# Patient Record
Sex: FEMALE | Race: WHITE | Employment: OTHER | URBAN - METROPOLITAN AREA
[De-identification: names, ages, dates, MRNs, and addresses within clinical notes are randomized per-mention and may not be internally consistent; named-entity substitution may affect disease eponyms.]

---

## 2017-03-16 PROBLEM — I65.29 CAROTID STENOSIS: Status: ACTIVE | Noted: 2017-03-16

## 2017-03-16 PROBLEM — M75.100 TORN ROTATOR CUFF: Status: ACTIVE | Noted: 2017-03-16

## 2017-03-27 ENCOUNTER — TELEPHONE (OUTPATIENT)
Dept: CARDIOLOGY CLINIC | Age: 72
End: 2017-03-27

## 2017-03-27 ENCOUNTER — OFFICE VISIT (OUTPATIENT)
Dept: CARDIOLOGY CLINIC | Age: 72
End: 2017-03-27

## 2017-03-27 VITALS
DIASTOLIC BLOOD PRESSURE: 78 MMHG | BODY MASS INDEX: 28.1 KG/M2 | HEART RATE: 56 BPM | SYSTOLIC BLOOD PRESSURE: 134 MMHG | HEIGHT: 63 IN | WEIGHT: 158.6 LBS

## 2017-03-27 DIAGNOSIS — R55 SYNCOPE, UNSPECIFIED SYNCOPE TYPE: Primary | ICD-10-CM

## 2017-03-27 PROCEDURE — 99214 OFFICE O/P EST MOD 30 MIN: CPT | Performed by: INTERNAL MEDICINE

## 2017-03-27 RX ORDER — MINOCYCLINE HYDROCHLORIDE 50 MG/1
CAPSULE ORAL
COMMUNITY
Start: 2017-01-09 | End: 2018-12-20 | Stop reason: ALTCHOICE

## 2018-03-19 ENCOUNTER — OFFICE VISIT (OUTPATIENT)
Dept: CARDIOLOGY CLINIC | Age: 73
End: 2018-03-19

## 2018-03-19 VITALS
SYSTOLIC BLOOD PRESSURE: 132 MMHG | HEIGHT: 63 IN | BODY MASS INDEX: 28.14 KG/M2 | WEIGHT: 158.8 LBS | HEART RATE: 68 BPM | DIASTOLIC BLOOD PRESSURE: 76 MMHG

## 2018-03-19 DIAGNOSIS — I10 ESSENTIAL HYPERTENSION: Primary | ICD-10-CM

## 2018-03-19 PROCEDURE — 99214 OFFICE O/P EST MOD 30 MIN: CPT | Performed by: INTERNAL MEDICINE

## 2018-03-19 RX ORDER — LISINOPRIL 5 MG/1
5 TABLET ORAL DAILY
Qty: 30 TABLET | Refills: 3
Start: 2018-03-19 | End: 2018-12-20 | Stop reason: SDUPTHER

## 2018-03-19 NOTE — PROGRESS NOTES
arteries pulse and amplitude are normal no bruit, pedal pulses are normal  Femoral pulses have normal amplitude, no bruits   Extremities - No cyanosis, clubbing, or significant edema, no varicose veins    Abdomen  No masses, tenderness, or organomegaly, no hepato-splenomegally, no bruits  Musculoskeletal  No significant edema, no kyphosis or scoliosis, no deformity in any extremity noted, muscle strength and tone are normal  Skin: no ulcer,no scar,no stasis dermatitis   Neurologic  alert oriented times 3,Cranial nerves II through XII are grossly intact. There were no gross focal neurologic abnormalities. All sensory and motor nerves examined and were normal  Psychiatric: normal mood and affect    No results found for: CKTOTAL, CKMB, CKMBINDEX, TROPONINI  BNP:  No results found for: BNP  PT/INR:  No results found for: PTINR  No results found for: LABA1C  Lab Results   Component Value Date    CHOL 189 2015    TRIG 84 2015    HDL 77 2015    LDLDIRECT 109 (H) 2015     Lab Results   Component Value Date    ALT 16 2017    AST 36 2017     TSH:  No results found for: TSH    Impression:  Suzanna Costa is a 74 y. o.year old who  has a past medical history of History of ; Hyperlipidemia; Hypertension; and Sjoegren syndrome ([address] and presents with     Plan:  1. Syncope resolved. 2. Dyslipidemia: continue statins  3. HTN: stable, continue lopressor and lisinopril  4. Lymphedema:stable, continue compression socks  5. sjoegren syndrome: stable  6. Mild aortic stenosis: stable, conservative therapy is recommended  7. Health maintenance: exerise and diet  All labs, medications and tests reviewed, continue all other medications of all above medical condition listed as is.     [unfilled]

## 2018-12-14 ENCOUNTER — TELEPHONE (OUTPATIENT)
Dept: CARDIOLOGY CLINIC | Age: 73
End: 2018-12-14

## 2018-12-20 ENCOUNTER — OFFICE VISIT (OUTPATIENT)
Dept: CARDIOLOGY CLINIC | Age: 73
End: 2018-12-20
Payer: COMMERCIAL

## 2018-12-20 ENCOUNTER — NURSE ONLY (OUTPATIENT)
Dept: CARDIOLOGY CLINIC | Age: 73
End: 2018-12-20
Payer: COMMERCIAL

## 2018-12-20 VITALS
BODY MASS INDEX: 29.81 KG/M2 | DIASTOLIC BLOOD PRESSURE: 86 MMHG | WEIGHT: 162 LBS | HEIGHT: 62 IN | HEART RATE: 68 BPM | SYSTOLIC BLOOD PRESSURE: 170 MMHG

## 2018-12-20 DIAGNOSIS — R00.2 PALPITATIONS: Primary | ICD-10-CM

## 2018-12-20 DIAGNOSIS — I10 ESSENTIAL HYPERTENSION: ICD-10-CM

## 2018-12-20 PROCEDURE — 93000 ELECTROCARDIOGRAM COMPLETE: CPT | Performed by: INTERNAL MEDICINE

## 2018-12-20 PROCEDURE — 99214 OFFICE O/P EST MOD 30 MIN: CPT | Performed by: INTERNAL MEDICINE

## 2018-12-20 RX ORDER — LISINOPRIL 5 MG/1
20 TABLET ORAL DAILY
Qty: 30 TABLET | Refills: 3 | Status: SHIPPED | OUTPATIENT
Start: 2018-12-20 | End: 2019-02-18

## 2019-01-14 PROCEDURE — 93228 REMOTE 30 DAY ECG REV/REPORT: CPT | Performed by: INTERNAL MEDICINE

## 2019-01-15 ENCOUNTER — TELEPHONE (OUTPATIENT)
Dept: CARDIOLOGY CLINIC | Age: 74
End: 2019-01-15

## 2019-01-16 ENCOUNTER — OFFICE VISIT (OUTPATIENT)
Dept: CARDIOLOGY CLINIC | Age: 74
End: 2019-01-16
Payer: COMMERCIAL

## 2019-01-16 VITALS
SYSTOLIC BLOOD PRESSURE: 136 MMHG | HEART RATE: 64 BPM | DIASTOLIC BLOOD PRESSURE: 82 MMHG | HEIGHT: 61 IN | BODY MASS INDEX: 30.09 KG/M2 | WEIGHT: 159.4 LBS

## 2019-01-16 DIAGNOSIS — R00.2 PALPITATIONS: Primary | ICD-10-CM

## 2019-01-16 PROCEDURE — 99214 OFFICE O/P EST MOD 30 MIN: CPT | Performed by: INTERNAL MEDICINE

## 2019-01-18 ENCOUNTER — TELEPHONE (OUTPATIENT)
Dept: CARDIOLOGY CLINIC | Age: 74
End: 2019-01-18

## 2019-01-30 ENCOUNTER — TELEPHONE (OUTPATIENT)
Dept: CARDIOLOGY CLINIC | Age: 74
End: 2019-01-30

## 2019-02-11 ENCOUNTER — TELEPHONE (OUTPATIENT)
Dept: CARDIOLOGY CLINIC | Age: 74
End: 2019-02-11

## 2019-02-12 ENCOUNTER — TELEPHONE (OUTPATIENT)
Dept: CARDIOLOGY CLINIC | Age: 74
End: 2019-02-12

## 2019-02-18 ENCOUNTER — OFFICE VISIT (OUTPATIENT)
Dept: CARDIOLOGY CLINIC | Age: 74
End: 2019-02-18
Payer: COMMERCIAL

## 2019-02-18 ENCOUNTER — TELEPHONE (OUTPATIENT)
Dept: CARDIOLOGY CLINIC | Age: 74
End: 2019-02-18

## 2019-02-18 VITALS
DIASTOLIC BLOOD PRESSURE: 70 MMHG | BODY MASS INDEX: 30.47 KG/M2 | SYSTOLIC BLOOD PRESSURE: 134 MMHG | HEART RATE: 60 BPM | WEIGHT: 161.4 LBS | HEIGHT: 61 IN

## 2019-02-18 DIAGNOSIS — I35.0 NONRHEUMATIC AORTIC VALVE STENOSIS: Primary | ICD-10-CM

## 2019-02-18 PROCEDURE — 99214 OFFICE O/P EST MOD 30 MIN: CPT | Performed by: INTERNAL MEDICINE

## 2019-02-18 RX ORDER — LISINOPRIL 5 MG/1
5 TABLET ORAL DAILY
Status: ON HOLD | COMMUNITY
End: 2019-02-25 | Stop reason: HOSPADM

## 2019-02-20 ENCOUNTER — TELEPHONE (OUTPATIENT)
Dept: CARDIOLOGY CLINIC | Age: 74
End: 2019-02-20

## 2019-02-20 ENCOUNTER — HOSPITAL ENCOUNTER (OUTPATIENT)
Age: 74
Discharge: HOME | DRG: 233 | End: 2019-02-20
Payer: COMMERCIAL

## 2019-02-20 ENCOUNTER — HOSPITAL ENCOUNTER (OUTPATIENT)
Dept: GENERAL RADIOLOGY | Age: 74
Discharge: HOME | DRG: 233 | End: 2019-02-20
Payer: COMMERCIAL

## 2019-02-20 DIAGNOSIS — Z01.818 PRE-PROCEDURAL EXAMINATION: ICD-10-CM

## 2019-02-20 LAB
ANION GAP SERPL CALCULATED.3IONS-SCNC: 12 MMOL/L (ref 4–16)
APTT: 27.6 SECONDS (ref 21.2–33)
BASOPHILS ABSOLUTE: 0 K/CU MM
BASOPHILS RELATIVE PERCENT: 0.4 % (ref 0–1)
BUN BLDV-MCNC: 27 MG/DL (ref 6–23)
CALCIUM SERPL-MCNC: 9.4 MG/DL (ref 8.3–10.6)
CHLORIDE BLD-SCNC: 95 MMOL/L (ref 99–110)
CO2: 28 MMOL/L (ref 21–32)
CREAT SERPL-MCNC: 1.1 MG/DL (ref 0.6–1.1)
DIFFERENTIAL TYPE: ABNORMAL
EOSINOPHILS ABSOLUTE: 0.2 K/CU MM
EOSINOPHILS RELATIVE PERCENT: 2.3 % (ref 0–3)
GFR AFRICAN AMERICAN: 59 ML/MIN/1.73M2
GFR NON-AFRICAN AMERICAN: 48 ML/MIN/1.73M2
GLUCOSE BLD-MCNC: 91 MG/DL (ref 70–99)
HCT VFR BLD CALC: 42.9 % (ref 37–47)
HEMOGLOBIN: 13.4 GM/DL (ref 12.5–16)
IMMATURE NEUTROPHIL %: 0.5 % (ref 0–0.43)
INR BLD: 1.04 INDEX
LYMPHOCYTES ABSOLUTE: 1.9 K/CU MM
LYMPHOCYTES RELATIVE PERCENT: 24.5 % (ref 24–44)
MCH RBC QN AUTO: 28.8 PG (ref 27–31)
MCHC RBC AUTO-ENTMCNC: 31.2 % (ref 32–36)
MCV RBC AUTO: 92.3 FL (ref 78–100)
MONOCYTES ABSOLUTE: 1 K/CU MM
MONOCYTES RELATIVE PERCENT: 12.4 % (ref 0–4)
NUCLEATED RBC %: 0 %
PDW BLD-RTO: 13.4 % (ref 11.7–14.9)
PLATELET # BLD: 215 K/CU MM (ref 140–440)
PMV BLD AUTO: 12.6 FL (ref 7.5–11.1)
POTASSIUM SERPL-SCNC: 4 MMOL/L (ref 3.5–5.1)
PROTHROMBIN TIME: 11.8 SECONDS (ref 9.12–12.5)
RBC # BLD: 4.65 M/CU MM (ref 4.2–5.4)
SEGMENTED NEUTROPHILS ABSOLUTE COUNT: 4.6 K/CU MM
SEGMENTED NEUTROPHILS RELATIVE PERCENT: 59.9 % (ref 36–66)
SODIUM BLD-SCNC: 135 MMOL/L (ref 135–145)
TOTAL IMMATURE NEUTOROPHIL: 0.04 K/CU MM
TOTAL NUCLEATED RBC: 0 K/CU MM
WBC # BLD: 7.7 K/CU MM (ref 4–10.5)

## 2019-02-20 PROCEDURE — 71046 X-RAY EXAM CHEST 2 VIEWS: CPT

## 2019-02-20 PROCEDURE — 86901 BLOOD TYPING SEROLOGIC RH(D): CPT

## 2019-02-20 PROCEDURE — 85730 THROMBOPLASTIN TIME PARTIAL: CPT

## 2019-02-20 PROCEDURE — 36415 COLL VENOUS BLD VENIPUNCTURE: CPT

## 2019-02-20 PROCEDURE — 86900 BLOOD TYPING SEROLOGIC ABO: CPT

## 2019-02-20 PROCEDURE — 80048 BASIC METABOLIC PNL TOTAL CA: CPT

## 2019-02-20 PROCEDURE — 85610 PROTHROMBIN TIME: CPT

## 2019-02-20 PROCEDURE — 86850 RBC ANTIBODY SCREEN: CPT

## 2019-02-20 PROCEDURE — 86922 COMPATIBILITY TEST ANTIGLOB: CPT

## 2019-02-20 PROCEDURE — 85025 COMPLETE CBC W/AUTO DIFF WBC: CPT

## 2019-02-21 ENCOUNTER — APPOINTMENT (OUTPATIENT)
Dept: GENERAL RADIOLOGY | Age: 74
DRG: 233 | End: 2019-02-21
Attending: INTERNAL MEDICINE
Payer: COMMERCIAL

## 2019-02-21 ENCOUNTER — HOSPITAL ENCOUNTER (INPATIENT)
Dept: CARDIAC CATH/INVASIVE PROCEDURES | Age: 74
LOS: 3 days | Discharge: HOME HEALTH | DRG: 233 | End: 2019-02-25
Attending: INTERNAL MEDICINE
Payer: COMMERCIAL

## 2019-02-21 ENCOUNTER — ANESTHESIA (OUTPATIENT)
Dept: OPERATING ROOM | Age: 74
DRG: 233 | End: 2019-02-21
Payer: COMMERCIAL

## 2019-02-21 ENCOUNTER — ANESTHESIA EVENT (OUTPATIENT)
Dept: OPERATING ROOM | Age: 74
DRG: 233 | End: 2019-02-21
Payer: COMMERCIAL

## 2019-02-21 VITALS — RESPIRATION RATE: 8 BRPM | OXYGEN SATURATION: 100 % | TEMPERATURE: 97.9 F

## 2019-02-21 PROBLEM — I47.29 NSVT (NONSUSTAINED VENTRICULAR TACHYCARDIA) (HCC): Status: ACTIVE | Noted: 2019-02-21

## 2019-02-21 LAB
ACTIVATED CLOTTING TIME, LOW RANGE: >400 SEC
ANION GAP SERPL CALCULATED.3IONS-SCNC: 9 MMOL/L (ref 4–16)
APTT: 26 SECONDS (ref 21.2–33)
BASE EXCESS MIXED: 0.3 (ref 0–2.3)
BASE EXCESS MIXED: 1.1 (ref 0–2.3)
BASE EXCESS MIXED: 3.1 (ref 0–2.3)
BASE EXCESS MIXED: 4.6 (ref 0–2.3)
BASE EXCESS: ABNORMAL (ref 0–2.4)
BUN BLDV-MCNC: 17 MG/DL (ref 6–23)
CALCIUM SERPL-MCNC: 7.9 MG/DL (ref 8.3–10.6)
CHLORIDE BLD-SCNC: 108 MMOL/L (ref 99–110)
CO2: 19 MMOL/L (ref 21–32)
CO2: 21 MMOL/L (ref 21–32)
CO2: 22 MMOL/L (ref 21–32)
CO2: 23 MMOL/L (ref 21–32)
CO2: 24 MMOL/L (ref 21–32)
CREAT SERPL-MCNC: 0.9 MG/DL (ref 0.6–1.1)
GFR AFRICAN AMERICAN: >60 ML/MIN/1.73M2
GFR AFRICAN AMERICAN: >60 ML/MIN/1.73M2
GFR NON-AFRICAN AMERICAN: 58 ML/MIN/1.73M2
GFR NON-AFRICAN AMERICAN: >60 ML/MIN/1.73M2
GLUCOSE BLD-MCNC: 105 MG/DL (ref 70–99)
GLUCOSE BLD-MCNC: 136 MG/DL (ref 70–99)
GLUCOSE BLD-MCNC: 138 MG/DL (ref 70–99)
GLUCOSE BLD-MCNC: 80 MG/DL (ref 70–99)
GLUCOSE BLD-MCNC: 88 MG/DL (ref 70–99)
GLUCOSE BLD-MCNC: 90 MG/DL (ref 70–99)
HCO3 ARTERIAL: 19.7 MMOL/L (ref 18–23)
HCO3 ARTERIAL: 20.6 MMOL/L (ref 18–23)
HCO3 ARTERIAL: 22 MMOL/L (ref 18–23)
HCO3 ARTERIAL: 22.7 MMOL/L (ref 18–23)
HCT VFR BLD CALC: 23 % (ref 37–47)
HCT VFR BLD CALC: 25 % (ref 37–47)
HCT VFR BLD CALC: 26 % (ref 37–47)
HCT VFR BLD CALC: 27.7 % (ref 37–47)
HCT VFR BLD CALC: 35 % (ref 37–47)
HEMOGLOBIN: 12 GM/DL (ref 12.5–16)
HEMOGLOBIN: 7.8 GM/DL (ref 12.5–16)
HEMOGLOBIN: 8.4 GM/DL (ref 12.5–16)
HEMOGLOBIN: 8.6 GM/DL (ref 12.5–16)
HEMOGLOBIN: 8.8 GM/DL (ref 12.5–16)
INR BLD: 1.43 INDEX
MAGNESIUM: 2.9 MG/DL (ref 1.8–2.4)
MCH RBC QN AUTO: 28.9 PG (ref 27–31)
MCHC RBC AUTO-ENTMCNC: 31 % (ref 32–36)
MCV RBC AUTO: 93 FL (ref 78–100)
O2 SATURATION: 100 % (ref 96–97)
O2 SATURATION: 96.7 % (ref 96–97)
PCO2 ARTERIAL: 29.6 MMHG (ref 32–45)
PCO2 ARTERIAL: 30.7 MMHG (ref 32–45)
PCO2 ARTERIAL: 31 MMHG (ref 32–45)
PCO2 ARTERIAL: 32 MMHG (ref 32–45)
PDW BLD-RTO: 13.2 % (ref 11.7–14.9)
PH BLOOD: 7.4 (ref 7.34–7.45)
PH BLOOD: 7.44 (ref 7.34–7.45)
PH BLOOD: 7.46 (ref 7.34–7.45)
PH BLOOD: 7.49 (ref 7.34–7.45)
PLATELET # BLD: 93 K/CU MM (ref 140–440)
PMV BLD AUTO: 12.2 FL (ref 7.5–11.1)
PO2 ARTERIAL: 461 MMHG (ref 75–100)
PO2 ARTERIAL: 503.1 MMHG (ref 75–100)
PO2 ARTERIAL: 600.6 MMHG (ref 75–100)
PO2 ARTERIAL: 86.9 MMHG (ref 75–100)
POC ACT PLUS: 113 SEC
POC ACT PLUS: 164 SEC
POC ACT PLUS: 570 SEC
POC ACT PLUS: 622 SEC
POC CALCIUM: 1 MMOL/L (ref 1.12–1.32)
POC CALCIUM: 1.14 MMOL/L (ref 1.12–1.32)
POC CALCIUM: 1.25 MMOL/L (ref 1.12–1.32)
POC CALCIUM: 1.29 MMOL/L (ref 1.12–1.32)
POC CHLORIDE: 105 MMOL/L (ref 98–109)
POC CHLORIDE: 106 MMOL/L (ref 98–109)
POC CHLORIDE: 112 MMOL/L (ref 98–109)
POC CREATININE: 0.6 MG/DL (ref 0.6–1.1)
POC CREATININE: 0.7 MG/DL (ref 0.6–1.1)
POC CREATININE: 0.7 MG/DL (ref 0.6–1.1)
POC CREATININE: 0.9 MG/DL (ref 0.6–1.1)
POTASSIUM SERPL-SCNC: 3.5 MMOL/L (ref 3.5–4.5)
POTASSIUM SERPL-SCNC: 3.6 MMOL/L (ref 3.5–4.5)
POTASSIUM SERPL-SCNC: 3.6 MMOL/L (ref 3.5–5.1)
POTASSIUM SERPL-SCNC: 3.7 MMOL/L (ref 3.5–4.5)
POTASSIUM SERPL-SCNC: 4 MMOL/L (ref 3.5–4.5)
PROTHROMBIN TIME: 16.2 SECONDS (ref 9.12–12.5)
RBC # BLD: 2.98 M/CU MM (ref 4.2–5.4)
SODIUM BLD-SCNC: 136 MMOL/L (ref 135–145)
SODIUM BLD-SCNC: 141 MMOL/L (ref 138–146)
SODIUM BLD-SCNC: 142 MMOL/L (ref 138–146)
SODIUM BLD-SCNC: 145 MMOL/L (ref 138–146)
SODIUM BLD-SCNC: 145 MMOL/L (ref 138–146)
SOURCE, BLOOD GAS: ABNORMAL
WBC # BLD: 14.2 K/CU MM (ref 4–10.5)

## 2019-02-21 PROCEDURE — 2580000003 HC RX 258

## 2019-02-21 PROCEDURE — 6370000000 HC RX 637 (ALT 250 FOR IP): Performed by: INTERNAL MEDICINE

## 2019-02-21 PROCEDURE — 82962 GLUCOSE BLOOD TEST: CPT

## 2019-02-21 PROCEDURE — 6370000000 HC RX 637 (ALT 250 FOR IP): Performed by: SURGERY

## 2019-02-21 PROCEDURE — 6360000002 HC RX W HCPCS

## 2019-02-21 PROCEDURE — 85610 PROTHROMBIN TIME: CPT

## 2019-02-21 PROCEDURE — 3700000000 HC ANESTHESIA ATTENDED CARE: Performed by: SURGERY

## 2019-02-21 PROCEDURE — B2111ZZ FLUOROSCOPY OF MULTIPLE CORONARY ARTERIES USING LOW OSMOLAR CONTRAST: ICD-10-PCS | Performed by: INTERNAL MEDICINE

## 2019-02-21 PROCEDURE — 2580000003 HC RX 258: Performed by: INTERNAL MEDICINE

## 2019-02-21 PROCEDURE — C1874 STENT, COATED/COV W/DEL SYS: HCPCS

## 2019-02-21 PROCEDURE — 4A023N7 MEASUREMENT OF CARDIAC SAMPLING AND PRESSURE, LEFT HEART, PERCUTANEOUS APPROACH: ICD-10-PCS | Performed by: INTERNAL MEDICINE

## 2019-02-21 PROCEDURE — C1894 INTRO/SHEATH, NON-LASER: HCPCS

## 2019-02-21 PROCEDURE — 93458 L HRT ARTERY/VENTRICLE ANGIO: CPT | Performed by: INTERNAL MEDICINE

## 2019-02-21 PROCEDURE — 2500000003 HC RX 250 WO HCPCS: Performed by: INTERNAL MEDICINE

## 2019-02-21 PROCEDURE — 06BQ4ZZ EXCISION OF LEFT SAPHENOUS VEIN, PERCUTANEOUS ENDOSCOPIC APPROACH: ICD-10-PCS | Performed by: SURGERY

## 2019-02-21 PROCEDURE — B246ZZ4 ULTRASONOGRAPHY OF RIGHT AND LEFT HEART, TRANSESOPHAGEAL: ICD-10-PCS | Performed by: SURGERY

## 2019-02-21 PROCEDURE — C1894 INTRO/SHEATH, NON-LASER: HCPCS | Performed by: SURGERY

## 2019-02-21 PROCEDURE — P9045 ALBUMIN (HUMAN), 5%, 250 ML: HCPCS | Performed by: NURSE ANESTHETIST, CERTIFIED REGISTERED

## 2019-02-21 PROCEDURE — 6370000000 HC RX 637 (ALT 250 FOR IP): Performed by: PHYSICIAN ASSISTANT

## 2019-02-21 PROCEDURE — 6360000002 HC RX W HCPCS: Performed by: SURGERY

## 2019-02-21 PROCEDURE — C1729 CATH, DRAINAGE: HCPCS | Performed by: SURGERY

## 2019-02-21 PROCEDURE — 33967 INSERT I-AORT PERCUT DEVICE: CPT | Performed by: INTERNAL MEDICINE

## 2019-02-21 PROCEDURE — C1751 CATH, INF, PER/CENT/MIDLINE: HCPCS | Performed by: SURGERY

## 2019-02-21 PROCEDURE — 80048 BASIC METABOLIC PNL TOTAL CA: CPT

## 2019-02-21 PROCEDURE — 2709999900 HC NON-CHARGEABLE SUPPLY

## 2019-02-21 PROCEDURE — 2780000010 HC IMPLANT OTHER: Performed by: SURGERY

## 2019-02-21 PROCEDURE — 02100Z9 BYPASS CORONARY ARTERY, ONE ARTERY FROM LEFT INTERNAL MAMMARY, OPEN APPROACH: ICD-10-PCS | Performed by: SURGERY

## 2019-02-21 PROCEDURE — 5A1221Z PERFORMANCE OF CARDIAC OUTPUT, CONTINUOUS: ICD-10-PCS | Performed by: SURGERY

## 2019-02-21 PROCEDURE — 85027 COMPLETE CBC AUTOMATED: CPT

## 2019-02-21 PROCEDURE — 3700000001 HC ADD 15 MINUTES (ANESTHESIA): Performed by: SURGERY

## 2019-02-21 PROCEDURE — C1887 CATHETER, GUIDING: HCPCS

## 2019-02-21 PROCEDURE — 2720000010 HC SURG SUPPLY STERILE: Performed by: SURGERY

## 2019-02-21 PROCEDURE — 92920 PRQ TRLUML C ANGIOP 1ART&/BR: CPT | Performed by: INTERNAL MEDICINE

## 2019-02-21 PROCEDURE — C1769 GUIDE WIRE: HCPCS

## 2019-02-21 PROCEDURE — 6360000002 HC RX W HCPCS: Performed by: NURSE ANESTHETIST, CERTIFIED REGISTERED

## 2019-02-21 PROCEDURE — 2709999900 HC NON-CHARGEABLE SUPPLY: Performed by: SURGERY

## 2019-02-21 PROCEDURE — 2580000003 HC RX 258: Performed by: PHYSICIAN ASSISTANT

## 2019-02-21 PROCEDURE — 2500000003 HC RX 250 WO HCPCS: Performed by: SURGERY

## 2019-02-21 PROCEDURE — 99291 CRITICAL CARE FIRST HOUR: CPT | Performed by: INTERNAL MEDICINE

## 2019-02-21 PROCEDURE — 3600000008 HC SURGERY OHS BASE: Performed by: SURGERY

## 2019-02-21 PROCEDURE — 7100000000 HC PACU RECOVERY - FIRST 15 MIN

## 2019-02-21 PROCEDURE — 33970 AORTIC CIRCULATION ASSIST: CPT | Performed by: INTERNAL MEDICINE

## 2019-02-21 PROCEDURE — 83735 ASSAY OF MAGNESIUM: CPT

## 2019-02-21 PROCEDURE — 71045 X-RAY EXAM CHEST 1 VIEW: CPT

## 2019-02-21 PROCEDURE — C1725 CATH, TRANSLUMIN NON-LASER: HCPCS

## 2019-02-21 PROCEDURE — 6370000000 HC RX 637 (ALT 250 FOR IP)

## 2019-02-21 PROCEDURE — 3600000018 HC SURGERY OHS ADDTL 15MIN: Performed by: SURGERY

## 2019-02-21 PROCEDURE — 6360000004 HC RX CONTRAST MEDICATION

## 2019-02-21 PROCEDURE — 85730 THROMBOPLASTIN TIME PARTIAL: CPT

## 2019-02-21 PROCEDURE — 2580000003 HC RX 258: Performed by: SURGERY

## 2019-02-21 PROCEDURE — 2500000003 HC RX 250 WO HCPCS: Performed by: NURSE ANESTHETIST, CERTIFIED REGISTERED

## 2019-02-21 PROCEDURE — 6360000002 HC RX W HCPCS: Performed by: PHYSICIAN ASSISTANT

## 2019-02-21 PROCEDURE — 5A02210 ASSISTANCE WITH CARDIAC OUTPUT USING BALLOON PUMP, CONTINUOUS: ICD-10-PCS | Performed by: INTERNAL MEDICINE

## 2019-02-21 PROCEDURE — 84132 ASSAY OF SERUM POTASSIUM: CPT

## 2019-02-21 PROCEDURE — 2580000003 HC RX 258: Performed by: NURSE ANESTHETIST, CERTIFIED REGISTERED

## 2019-02-21 PROCEDURE — 37799 UNLISTED PX VASCULAR SURGERY: CPT

## 2019-02-21 PROCEDURE — 2500000003 HC RX 250 WO HCPCS

## 2019-02-21 PROCEDURE — C9248 INJ, CLEVIDIPINE BUTYRATE: HCPCS | Performed by: SURGERY

## 2019-02-21 PROCEDURE — 85347 COAGULATION TIME ACTIVATED: CPT

## 2019-02-21 DEVICE — AGENT HEMOSTATIC SURGIFLOW MATRIX KIT W/THROMBIN: Type: IMPLANTABLE DEVICE | Site: CHEST | Status: FUNCTIONAL

## 2019-02-21 RX ORDER — LABETALOL HYDROCHLORIDE 5 MG/ML
5 INJECTION, SOLUTION INTRAVENOUS EVERY 5 MIN PRN
Status: DISCONTINUED | OUTPATIENT
Start: 2019-02-21 | End: 2019-02-25 | Stop reason: HOSPADM

## 2019-02-21 RX ORDER — LIDOCAINE HYDROCHLORIDE 20 MG/ML
INJECTION, SOLUTION EPIDURAL; INFILTRATION; INTRACAUDAL; PERINEURAL PRN
Status: DISCONTINUED | OUTPATIENT
Start: 2019-02-21 | End: 2019-02-21 | Stop reason: SDUPTHER

## 2019-02-21 RX ORDER — DOBUTAMINE HYDROCHLORIDE 200 MG/100ML
2 INJECTION INTRAVENOUS CONTINUOUS PRN
Status: DISCONTINUED | OUTPATIENT
Start: 2019-02-21 | End: 2019-02-25 | Stop reason: HOSPADM

## 2019-02-21 RX ORDER — HYDROCODONE BITARTRATE AND ACETAMINOPHEN 5; 325 MG/1; MG/1
1 TABLET ORAL EVERY 4 HOURS PRN
Status: DISCONTINUED | OUTPATIENT
Start: 2019-02-21 | End: 2019-02-25 | Stop reason: HOSPADM

## 2019-02-21 RX ORDER — SODIUM CHLORIDE 9 MG/ML
INJECTION, SOLUTION INTRAVENOUS CONTINUOUS PRN
Status: DISCONTINUED | OUTPATIENT
Start: 2019-02-21 | End: 2019-02-21 | Stop reason: SDUPTHER

## 2019-02-21 RX ORDER — POTASSIUM CHLORIDE 29.8 MG/ML
20 INJECTION INTRAVENOUS PRN
Status: DISCONTINUED | OUTPATIENT
Start: 2019-02-21 | End: 2019-02-25 | Stop reason: HOSPADM

## 2019-02-21 RX ORDER — HYDROCODONE BITARTRATE AND ACETAMINOPHEN 5; 325 MG/1; MG/1
2 TABLET ORAL EVERY 4 HOURS PRN
Status: DISCONTINUED | OUTPATIENT
Start: 2019-02-21 | End: 2019-02-25 | Stop reason: HOSPADM

## 2019-02-21 RX ORDER — SODIUM CHLORIDE 0.9 % (FLUSH) 0.9 %
10 SYRINGE (ML) INJECTION PRN
Status: DISCONTINUED | OUTPATIENT
Start: 2019-02-21 | End: 2019-02-25 | Stop reason: HOSPADM

## 2019-02-21 RX ORDER — FENTANYL CITRATE 0.05 MG/ML
INJECTION, SOLUTION INTRAMUSCULAR; INTRAVENOUS PRN
Status: DISCONTINUED | OUTPATIENT
Start: 2019-02-21 | End: 2019-02-21 | Stop reason: SDUPTHER

## 2019-02-21 RX ORDER — FUROSEMIDE 10 MG/ML
20 INJECTION INTRAMUSCULAR; INTRAVENOUS PRN
Status: DISCONTINUED | OUTPATIENT
Start: 2019-02-21 | End: 2019-02-25 | Stop reason: HOSPADM

## 2019-02-21 RX ORDER — HEPARIN SODIUM (PORCINE) LOCK FLUSH IV SOLN 100 UNIT/ML 100 UNIT/ML
SOLUTION INTRAVENOUS PRN
Status: DISCONTINUED | OUTPATIENT
Start: 2019-02-21 | End: 2019-02-21 | Stop reason: SDUPTHER

## 2019-02-21 RX ORDER — DEXTROSE MONOHYDRATE 25 G/50ML
12.5 INJECTION, SOLUTION INTRAVENOUS PRN
Status: DISCONTINUED | OUTPATIENT
Start: 2019-02-21 | End: 2019-02-25 | Stop reason: HOSPADM

## 2019-02-21 RX ORDER — SODIUM CHLORIDE 9 MG/ML
INJECTION, SOLUTION INTRAVENOUS CONTINUOUS
Status: DISCONTINUED | OUTPATIENT
Start: 2019-02-21 | End: 2019-02-21

## 2019-02-21 RX ORDER — DIPHENHYDRAMINE HCL 25 MG
25 TABLET ORAL ONCE
Status: COMPLETED | OUTPATIENT
Start: 2019-02-21 | End: 2019-02-21

## 2019-02-21 RX ORDER — PAPAVERINE HYDROCHLORIDE 30 MG/ML
INJECTION INTRAMUSCULAR; INTRAVENOUS
Status: COMPLETED | OUTPATIENT
Start: 2019-02-21 | End: 2019-02-21

## 2019-02-21 RX ORDER — ALBUMIN, HUMAN INJ 5% 5 %
SOLUTION INTRAVENOUS PRN
Status: DISCONTINUED | OUTPATIENT
Start: 2019-02-21 | End: 2019-02-21 | Stop reason: SDUPTHER

## 2019-02-21 RX ORDER — DEXTROSE MONOHYDRATE 50 MG/ML
100 INJECTION, SOLUTION INTRAVENOUS PRN
Status: DISCONTINUED | OUTPATIENT
Start: 2019-02-21 | End: 2019-02-25 | Stop reason: HOSPADM

## 2019-02-21 RX ORDER — IPRATROPIUM BROMIDE AND ALBUTEROL SULFATE 2.5; .5 MG/3ML; MG/3ML
1 SOLUTION RESPIRATORY (INHALATION)
Status: DISCONTINUED | OUTPATIENT
Start: 2019-02-22 | End: 2019-02-25 | Stop reason: HOSPADM

## 2019-02-21 RX ORDER — FAMOTIDINE 20 MG/1
20 TABLET, FILM COATED ORAL 2 TIMES DAILY
Status: DISCONTINUED | OUTPATIENT
Start: 2019-02-22 | End: 2019-02-23 | Stop reason: SINTOL

## 2019-02-21 RX ORDER — ROCURONIUM BROMIDE 10 MG/ML
INJECTION, SOLUTION INTRAVENOUS PRN
Status: DISCONTINUED | OUTPATIENT
Start: 2019-02-21 | End: 2019-02-21 | Stop reason: SDUPTHER

## 2019-02-21 RX ORDER — SIMVASTATIN 10 MG
10 TABLET ORAL NIGHTLY
Status: DISCONTINUED | OUTPATIENT
Start: 2019-02-22 | End: 2019-02-25 | Stop reason: HOSPADM

## 2019-02-21 RX ORDER — METOCLOPRAMIDE HYDROCHLORIDE 5 MG/ML
10 INJECTION INTRAMUSCULAR; INTRAVENOUS EVERY 6 HOURS PRN
Status: DISCONTINUED | OUTPATIENT
Start: 2019-02-21 | End: 2019-02-25 | Stop reason: HOSPADM

## 2019-02-21 RX ORDER — M-VIT,TX,IRON,MINS/CALC/FOLIC 27MG-0.4MG
1 TABLET ORAL
Status: DISCONTINUED | OUTPATIENT
Start: 2019-02-22 | End: 2019-02-25 | Stop reason: HOSPADM

## 2019-02-21 RX ORDER — NITROGLYCERIN 20 MG/100ML
10 INJECTION INTRAVENOUS CONTINUOUS PRN
Status: DISCONTINUED | OUTPATIENT
Start: 2019-02-21 | End: 2019-02-25 | Stop reason: HOSPADM

## 2019-02-21 RX ORDER — SODIUM CHLORIDE 450 MG/100ML
INJECTION, SOLUTION INTRAVENOUS CONTINUOUS
Status: DISCONTINUED | OUTPATIENT
Start: 2019-02-21 | End: 2019-02-25 | Stop reason: HOSPADM

## 2019-02-21 RX ORDER — AMIODARONE HYDROCHLORIDE 200 MG/1
200 TABLET ORAL 3 TIMES DAILY
Status: COMPLETED | OUTPATIENT
Start: 2019-02-21 | End: 2019-02-25

## 2019-02-21 RX ORDER — SODIUM CHLORIDE 0.9 % (FLUSH) 0.9 %
10 SYRINGE (ML) INJECTION EVERY 12 HOURS SCHEDULED
Status: DISCONTINUED | OUTPATIENT
Start: 2019-02-21 | End: 2019-02-25 | Stop reason: HOSPADM

## 2019-02-21 RX ORDER — NICOTINE POLACRILEX 4 MG
15 LOZENGE BUCCAL PRN
Status: DISCONTINUED | OUTPATIENT
Start: 2019-02-21 | End: 2019-02-25 | Stop reason: HOSPADM

## 2019-02-21 RX ORDER — DIAZEPAM 5 MG/1
5 TABLET ORAL ONCE
Status: COMPLETED | OUTPATIENT
Start: 2019-02-21 | End: 2019-02-21

## 2019-02-21 RX ORDER — ALBUMIN, HUMAN INJ 5% 5 %
25 SOLUTION INTRAVENOUS PRN
Status: DISCONTINUED | OUTPATIENT
Start: 2019-02-21 | End: 2019-02-25 | Stop reason: HOSPADM

## 2019-02-21 RX ORDER — CARVEDILOL 6.25 MG/1
6.25 TABLET ORAL 2 TIMES DAILY WITH MEALS
Status: DISCONTINUED | OUTPATIENT
Start: 2019-02-22 | End: 2019-02-22

## 2019-02-21 RX ORDER — CEFAZOLIN SODIUM 1 G/50ML
1 INJECTION, SOLUTION INTRAVENOUS ONCE
Status: COMPLETED | OUTPATIENT
Start: 2019-02-21 | End: 2019-02-21

## 2019-02-21 RX ORDER — PROPOFOL 10 MG/ML
INJECTION, EMULSION INTRAVENOUS PRN
Status: DISCONTINUED | OUTPATIENT
Start: 2019-02-21 | End: 2019-02-21 | Stop reason: SDUPTHER

## 2019-02-21 RX ORDER — PROTAMINE SULFATE 10 MG/ML
INJECTION, SOLUTION INTRAVENOUS PRN
Status: DISCONTINUED | OUTPATIENT
Start: 2019-02-21 | End: 2019-02-21 | Stop reason: SDUPTHER

## 2019-02-21 RX ORDER — KETOROLAC TROMETHAMINE 30 MG/ML
15 INJECTION, SOLUTION INTRAMUSCULAR; INTRAVENOUS EVERY 6 HOURS PRN
Status: DISCONTINUED | OUTPATIENT
Start: 2019-02-21 | End: 2019-02-25 | Stop reason: HOSPADM

## 2019-02-21 RX ORDER — MORPHINE SULFATE 4 MG/ML
4 INJECTION, SOLUTION INTRAMUSCULAR; INTRAVENOUS
Status: ACTIVE | OUTPATIENT
Start: 2019-02-21 | End: 2019-02-23

## 2019-02-21 RX ORDER — ETOMIDATE 2 MG/ML
INJECTION INTRAVENOUS PRN
Status: DISCONTINUED | OUTPATIENT
Start: 2019-02-21 | End: 2019-02-21 | Stop reason: SDUPTHER

## 2019-02-21 RX ORDER — ASPIRIN 81 MG/1
81 TABLET ORAL DAILY
Status: DISCONTINUED | OUTPATIENT
Start: 2019-02-22 | End: 2019-02-25 | Stop reason: HOSPADM

## 2019-02-21 RX ORDER — ACETAMINOPHEN 325 MG/1
650 TABLET ORAL EVERY 4 HOURS PRN
Status: DISCONTINUED | OUTPATIENT
Start: 2019-02-21 | End: 2019-02-25 | Stop reason: HOSPADM

## 2019-02-21 RX ORDER — AMIODARONE HYDROCHLORIDE 200 MG/1
200 TABLET ORAL DAILY
Status: DISCONTINUED | OUTPATIENT
Start: 2019-02-25 | End: 2019-02-25 | Stop reason: HOSPADM

## 2019-02-21 RX ORDER — MAGNESIUM SULFATE IN WATER 40 MG/ML
2 INJECTION, SOLUTION INTRAVENOUS PRN
Status: DISCONTINUED | OUTPATIENT
Start: 2019-02-21 | End: 2019-02-25 | Stop reason: HOSPADM

## 2019-02-21 RX ORDER — DOCUSATE SODIUM 100 MG/1
100 CAPSULE, LIQUID FILLED ORAL 2 TIMES DAILY
Status: DISCONTINUED | OUTPATIENT
Start: 2019-02-22 | End: 2019-02-25 | Stop reason: HOSPADM

## 2019-02-21 RX ORDER — CEFAZOLIN SODIUM 2 G/100ML
2 INJECTION, SOLUTION INTRAVENOUS EVERY 8 HOURS
Status: COMPLETED | OUTPATIENT
Start: 2019-02-22 | End: 2019-02-22

## 2019-02-21 RX ORDER — ONDANSETRON 2 MG/ML
4 INJECTION INTRAMUSCULAR; INTRAVENOUS EVERY 8 HOURS PRN
Status: COMPLETED | OUTPATIENT
Start: 2019-02-21 | End: 2019-02-24

## 2019-02-21 RX ORDER — MORPHINE SULFATE 4 MG/ML
2 INJECTION, SOLUTION INTRAMUSCULAR; INTRAVENOUS
Status: ACTIVE | OUTPATIENT
Start: 2019-02-21 | End: 2019-02-23

## 2019-02-21 RX ORDER — 0.9 % SODIUM CHLORIDE 0.9 %
500 INTRAVENOUS SOLUTION INTRAVENOUS CONTINUOUS PRN
Status: DISCONTINUED | OUTPATIENT
Start: 2019-02-21 | End: 2019-02-25 | Stop reason: HOSPADM

## 2019-02-21 RX ORDER — ACETAMINOPHEN 10 MG/ML
1000 INJECTION, SOLUTION INTRAVENOUS ONCE
Status: COMPLETED | OUTPATIENT
Start: 2019-02-22 | End: 2019-02-22

## 2019-02-21 RX ADMIN — DEXTROSE MONOHYDRATE 0.5 MG/MIN: 50 INJECTION, SOLUTION INTRAVENOUS at 22:40

## 2019-02-21 RX ADMIN — LIDOCAINE HYDROCHLORIDE 80 MG: 20 INJECTION, SOLUTION EPIDURAL; INFILTRATION; INTRACAUDAL; PERINEURAL at 17:34

## 2019-02-21 RX ADMIN — FENTANYL CITRATE 250 MCG: 50 INJECTION, SOLUTION INTRAMUSCULAR; INTRAVENOUS at 19:15

## 2019-02-21 RX ADMIN — AMINOCAPROIC ACID 5 G: 250 INJECTION, SOLUTION INTRAVENOUS at 17:43

## 2019-02-21 RX ADMIN — PROPOFOL 20 MG: 10 INJECTION, EMULSION INTRAVENOUS at 17:34

## 2019-02-21 RX ADMIN — CLEVIPIDINE 3 MG/HR: 0.5 EMULSION INTRAVENOUS at 20:04

## 2019-02-21 RX ADMIN — ETOMIDATE 10 MG: 2 INJECTION, SOLUTION INTRAVENOUS at 17:34

## 2019-02-21 RX ADMIN — SODIUM CHLORIDE: 9 INJECTION, SOLUTION INTRAVENOUS at 11:40

## 2019-02-21 RX ADMIN — METOCLOPRAMIDE 10 MG: 5 INJECTION, SOLUTION INTRAMUSCULAR; INTRAVENOUS at 22:46

## 2019-02-21 RX ADMIN — KETOROLAC TROMETHAMINE 15 MG: 30 INJECTION, SOLUTION INTRAMUSCULAR at 21:42

## 2019-02-21 RX ADMIN — SODIUM CHLORIDE: 4.5 INJECTION, SOLUTION INTRAVENOUS at 20:56

## 2019-02-21 RX ADMIN — NOREPINEPHRINE BITARTRATE 2 MCG/MIN: 1 INJECTION INTRAVENOUS at 18:12

## 2019-02-21 RX ADMIN — PHENYLEPHRINE HYDROCHLORIDE 100 MCG: 10 INJECTION INTRAVENOUS at 18:05

## 2019-02-21 RX ADMIN — DIAZEPAM 5 MG: 5 TABLET ORAL at 11:40

## 2019-02-21 RX ADMIN — FENTANYL CITRATE 250 MCG: 50 INJECTION, SOLUTION INTRAMUSCULAR; INTRAVENOUS at 17:32

## 2019-02-21 RX ADMIN — SODIUM CHLORIDE 1 UNITS/HR: 9 INJECTION, SOLUTION INTRAVENOUS at 18:18

## 2019-02-21 RX ADMIN — FENTANYL CITRATE 150 MCG: 50 INJECTION, SOLUTION INTRAMUSCULAR; INTRAVENOUS at 17:51

## 2019-02-21 RX ADMIN — PROTAMINE SULFATE 300 MG: 10 INJECTION, SOLUTION INTRAVENOUS at 19:21

## 2019-02-21 RX ADMIN — EPINEPHRINE 3 MCG/MIN: 1 INJECTION, SOLUTION, CONCENTRATE INTRAVENOUS at 19:01

## 2019-02-21 RX ADMIN — HEPARIN SODIUM (PORCINE) LOCK FLUSH IV SOLN 100 UNIT/ML 25000 UNITS: 100 SOLUTION at 18:12

## 2019-02-21 RX ADMIN — Medication: at 23:00

## 2019-02-21 RX ADMIN — ALBUMIN (HUMAN) 250 ML: 0.05 SOLUTION INTRAVENOUS at 20:05

## 2019-02-21 RX ADMIN — PROPOFOL 50 MG: 10 INJECTION, EMULSION INTRAVENOUS at 19:10

## 2019-02-21 RX ADMIN — ALBUMIN (HUMAN) 250 ML: 0.05 SOLUTION INTRAVENOUS at 19:37

## 2019-02-21 RX ADMIN — SODIUM CHLORIDE: 9 INJECTION, SOLUTION INTRAVENOUS at 17:16

## 2019-02-21 RX ADMIN — FENTANYL CITRATE 250 MCG: 50 INJECTION, SOLUTION INTRAMUSCULAR; INTRAVENOUS at 20:00

## 2019-02-21 RX ADMIN — FENTANYL CITRATE 100 MCG: 50 INJECTION, SOLUTION INTRAMUSCULAR; INTRAVENOUS at 18:00

## 2019-02-21 RX ADMIN — CEFAZOLIN SODIUM 1 G: 1 INJECTION, SOLUTION INTRAVENOUS at 17:41

## 2019-02-21 RX ADMIN — ROCURONIUM BROMIDE 100 MG: 50 INJECTION, SOLUTION INTRAVENOUS at 17:42

## 2019-02-21 RX ADMIN — DIPHENHYDRAMINE HCL 25 MG: 25 TABLET ORAL at 11:40

## 2019-02-21 RX ADMIN — ALBUMIN (HUMAN) 250 ML: 0.05 SOLUTION INTRAVENOUS at 19:45

## 2019-02-21 ASSESSMENT — PULMONARY FUNCTION TESTS
PIF_VALUE: 15
PIF_VALUE: 1
PIF_VALUE: 16
PIF_VALUE: 18
PIF_VALUE: 13
PIF_VALUE: 14
PIF_VALUE: 15
PIF_VALUE: 14
PIF_VALUE: 16
PIF_VALUE: 17
PIF_VALUE: 16
PIF_VALUE: 15
PIF_VALUE: 1
PIF_VALUE: 14
PIF_VALUE: 1
PIF_VALUE: 17
PIF_VALUE: 2
PIF_VALUE: 13
PIF_VALUE: 1
PIF_VALUE: 18
PIF_VALUE: 17
PIF_VALUE: 17
PIF_VALUE: 16
PIF_VALUE: 16
PIF_VALUE: 2
PIF_VALUE: 16
PIF_VALUE: 15
PIF_VALUE: 1
PIF_VALUE: 14
PIF_VALUE: 15
PIF_VALUE: 1
PIF_VALUE: 15
PIF_VALUE: 15
PIF_VALUE: 17
PIF_VALUE: 13
PIF_VALUE: 14
PIF_VALUE: 1
PIF_VALUE: 17
PIF_VALUE: 1
PIF_VALUE: 0
PIF_VALUE: 15
PIF_VALUE: 1
PIF_VALUE: 2
PIF_VALUE: 14
PIF_VALUE: 2
PIF_VALUE: 19
PIF_VALUE: 15
PIF_VALUE: 1
PIF_VALUE: 1
PIF_VALUE: 14
PIF_VALUE: 2
PIF_VALUE: 1
PIF_VALUE: 17
PIF_VALUE: 16
PIF_VALUE: 1
PIF_VALUE: 19
PIF_VALUE: 13
PIF_VALUE: 17
PIF_VALUE: 20
PIF_VALUE: 16
PIF_VALUE: 15
PIF_VALUE: 17
PIF_VALUE: 1
PIF_VALUE: 15
PIF_VALUE: 1
PIF_VALUE: 15
PIF_VALUE: 14
PIF_VALUE: 14
PIF_VALUE: 16
PIF_VALUE: 14
PIF_VALUE: 15
PIF_VALUE: 17
PIF_VALUE: 1
PIF_VALUE: 0
PIF_VALUE: 18
PIF_VALUE: 1
PIF_VALUE: 15
PIF_VALUE: 14
PIF_VALUE: 13
PIF_VALUE: 1
PIF_VALUE: 17
PIF_VALUE: 14
PIF_VALUE: 13
PIF_VALUE: 14
PIF_VALUE: 1
PIF_VALUE: 15
PIF_VALUE: 1
PIF_VALUE: 4
PIF_VALUE: 1
PIF_VALUE: 21
PIF_VALUE: 15
PIF_VALUE: 16
PIF_VALUE: 1
PIF_VALUE: 0
PIF_VALUE: 14
PIF_VALUE: 13
PIF_VALUE: 1
PIF_VALUE: 13
PIF_VALUE: 18
PIF_VALUE: 18
PIF_VALUE: 14
PIF_VALUE: 15
PIF_VALUE: 13
PIF_VALUE: 18
PIF_VALUE: 16
PIF_VALUE: 15
PIF_VALUE: 17
PIF_VALUE: 0
PIF_VALUE: 15
PIF_VALUE: 1
PIF_VALUE: 14
PIF_VALUE: 16
PIF_VALUE: 15
PIF_VALUE: 19
PIF_VALUE: 1
PIF_VALUE: 17
PIF_VALUE: 17
PIF_VALUE: 13
PIF_VALUE: 15
PIF_VALUE: 20
PIF_VALUE: 32
PIF_VALUE: 19
PIF_VALUE: 0
PIF_VALUE: 2
PIF_VALUE: 16
PIF_VALUE: 14
PIF_VALUE: 19
PIF_VALUE: 14
PIF_VALUE: 15
PIF_VALUE: 1
PIF_VALUE: 13
PIF_VALUE: 1
PIF_VALUE: 17
PIF_VALUE: 2
PIF_VALUE: 16
PIF_VALUE: 15
PIF_VALUE: 17
PIF_VALUE: 15
PIF_VALUE: 16
PIF_VALUE: 15
PIF_VALUE: 2
PIF_VALUE: 17
PIF_VALUE: 21
PIF_VALUE: 36
PIF_VALUE: 16
PIF_VALUE: 1
PIF_VALUE: 17
PIF_VALUE: 1
PIF_VALUE: 1
PIF_VALUE: 16
PIF_VALUE: 14
PIF_VALUE: 1
PIF_VALUE: 16
PIF_VALUE: 1
PIF_VALUE: 18
PIF_VALUE: 1
PIF_VALUE: 14
PIF_VALUE: 17
PIF_VALUE: 0
PIF_VALUE: 16
PIF_VALUE: 13
PIF_VALUE: 1
PIF_VALUE: 0
PIF_VALUE: 14
PIF_VALUE: 1
PIF_VALUE: 1
PIF_VALUE: 15
PIF_VALUE: 1
PIF_VALUE: 1
PIF_VALUE: 17
PIF_VALUE: 15
PIF_VALUE: 17
PIF_VALUE: 14
PIF_VALUE: 14
PIF_VALUE: 1
PIF_VALUE: 15
PIF_VALUE: 19
PIF_VALUE: 2
PIF_VALUE: 16
PIF_VALUE: 13
PIF_VALUE: 17
PIF_VALUE: 17
PIF_VALUE: 13
PIF_VALUE: 18
PIF_VALUE: 18
PIF_VALUE: 1

## 2019-02-21 ASSESSMENT — PAIN SCALES - GENERAL
PAINLEVEL_OUTOF10: 0
PAINLEVEL_OUTOF10: 5

## 2019-02-22 ENCOUNTER — APPOINTMENT (OUTPATIENT)
Dept: GENERAL RADIOLOGY | Age: 74
DRG: 233 | End: 2019-02-22
Attending: INTERNAL MEDICINE
Payer: COMMERCIAL

## 2019-02-22 PROBLEM — Z95.1 S/P CABG (CORONARY ARTERY BYPASS GRAFT): Status: ACTIVE | Noted: 2019-02-22

## 2019-02-22 LAB
ANION GAP SERPL CALCULATED.3IONS-SCNC: 10 MMOL/L (ref 4–16)
BUN BLDV-MCNC: 18 MG/DL (ref 6–23)
CALCIUM IONIZED: 4.6 MG/DL (ref 4.48–5.28)
CALCIUM SERPL-MCNC: 7.9 MG/DL (ref 8.3–10.6)
CHLORIDE BLD-SCNC: 110 MMOL/L (ref 99–110)
CO2: 20 MMOL/L (ref 21–32)
CREAT SERPL-MCNC: 1.2 MG/DL (ref 0.6–1.1)
GFR AFRICAN AMERICAN: 53 ML/MIN/1.73M2
GFR NON-AFRICAN AMERICAN: 44 ML/MIN/1.73M2
GLUCOSE BLD-MCNC: 117 MG/DL (ref 70–99)
GLUCOSE BLD-MCNC: 123 MG/DL (ref 70–99)
GLUCOSE BLD-MCNC: 125 MG/DL (ref 70–99)
GLUCOSE BLD-MCNC: 134 MG/DL (ref 70–99)
GLUCOSE BLD-MCNC: 138 MG/DL (ref 70–99)
GLUCOSE BLD-MCNC: 140 MG/DL (ref 70–99)
GLUCOSE BLD-MCNC: 140 MG/DL (ref 70–99)
GLUCOSE BLD-MCNC: 141 MG/DL (ref 70–99)
GLUCOSE BLD-MCNC: 142 MG/DL (ref 70–99)
GLUCOSE BLD-MCNC: 143 MG/DL (ref 70–99)
GLUCOSE BLD-MCNC: 144 MG/DL (ref 70–99)
GLUCOSE BLD-MCNC: 145 MG/DL (ref 70–99)
GLUCOSE BLD-MCNC: 145 MG/DL (ref 70–99)
GLUCOSE BLD-MCNC: 147 MG/DL (ref 70–99)
GLUCOSE BLD-MCNC: 148 MG/DL (ref 70–99)
GLUCOSE BLD-MCNC: 151 MG/DL (ref 70–99)
GLUCOSE BLD-MCNC: 151 MG/DL (ref 70–99)
GLUCOSE BLD-MCNC: 153 MG/DL (ref 70–99)
GLUCOSE BLD-MCNC: 154 MG/DL (ref 70–99)
GLUCOSE BLD-MCNC: 170 MG/DL (ref 70–99)
GLUCOSE BLD-MCNC: 224 MG/DL (ref 70–99)
HCT VFR BLD CALC: 33.1 % (ref 37–47)
HEMOGLOBIN: 9.9 GM/DL (ref 12.5–16)
IONIZED CA: 1.15 MMOL/L (ref 1.12–1.32)
MAGNESIUM: 2.4 MG/DL (ref 1.8–2.4)
MCH RBC QN AUTO: 28.6 PG (ref 27–31)
MCHC RBC AUTO-ENTMCNC: 29.9 % (ref 32–36)
MCV RBC AUTO: 95.7 FL (ref 78–100)
PDW BLD-RTO: 13.6 % (ref 11.7–14.9)
PLATELET # BLD: 120 K/CU MM (ref 140–440)
PMV BLD AUTO: 12 FL (ref 7.5–11.1)
POTASSIUM SERPL-SCNC: 3.9 MMOL/L (ref 3.5–5.1)
POTASSIUM SERPL-SCNC: 3.9 MMOL/L (ref 3.5–5.1)
RBC # BLD: 3.46 M/CU MM (ref 4.2–5.4)
SODIUM BLD-SCNC: 140 MMOL/L (ref 135–145)
WBC # BLD: 16 K/CU MM (ref 4–10.5)

## 2019-02-22 PROCEDURE — 6370000000 HC RX 637 (ALT 250 FOR IP): Performed by: PHYSICIAN ASSISTANT

## 2019-02-22 PROCEDURE — 80048 BASIC METABOLIC PNL TOTAL CA: CPT

## 2019-02-22 PROCEDURE — 82330 ASSAY OF CALCIUM: CPT

## 2019-02-22 PROCEDURE — 2100000000 HC CCU R&B

## 2019-02-22 PROCEDURE — 94640 AIRWAY INHALATION TREATMENT: CPT

## 2019-02-22 PROCEDURE — 83735 ASSAY OF MAGNESIUM: CPT

## 2019-02-22 PROCEDURE — 6360000002 HC RX W HCPCS

## 2019-02-22 PROCEDURE — 82962 GLUCOSE BLOOD TEST: CPT

## 2019-02-22 PROCEDURE — 6360000002 HC RX W HCPCS: Performed by: SURGERY

## 2019-02-22 PROCEDURE — 71045 X-RAY EXAM CHEST 1 VIEW: CPT

## 2019-02-22 PROCEDURE — 94150 VITAL CAPACITY TEST: CPT

## 2019-02-22 PROCEDURE — 99233 SBSQ HOSP IP/OBS HIGH 50: CPT | Performed by: INTERNAL MEDICINE

## 2019-02-22 PROCEDURE — 93308 TTE F-UP OR LMTD: CPT

## 2019-02-22 PROCEDURE — 6360000002 HC RX W HCPCS: Performed by: PHYSICIAN ASSISTANT

## 2019-02-22 PROCEDURE — 37799 UNLISTED PX VASCULAR SURGERY: CPT

## 2019-02-22 PROCEDURE — P9045 ALBUMIN (HUMAN), 5%, 250 ML: HCPCS | Performed by: PHYSICIAN ASSISTANT

## 2019-02-22 PROCEDURE — 94761 N-INVAS EAR/PLS OXIMETRY MLT: CPT

## 2019-02-22 PROCEDURE — 85027 COMPLETE CBC AUTOMATED: CPT

## 2019-02-22 PROCEDURE — 36592 COLLECT BLOOD FROM PICC: CPT

## 2019-02-22 PROCEDURE — 84132 ASSAY OF SERUM POTASSIUM: CPT

## 2019-02-22 PROCEDURE — 94664 DEMO&/EVAL PT USE INHALER: CPT

## 2019-02-22 PROCEDURE — 2700000000 HC OXYGEN THERAPY PER DAY

## 2019-02-22 PROCEDURE — 2580000003 HC RX 258: Performed by: PHYSICIAN ASSISTANT

## 2019-02-22 RX ORDER — CARVEDILOL 3.12 MG/1
3.12 TABLET ORAL 2 TIMES DAILY WITH MEALS
Status: DISCONTINUED | OUTPATIENT
Start: 2019-02-22 | End: 2019-02-25 | Stop reason: HOSPADM

## 2019-02-22 RX ORDER — ACETAMINOPHEN 10 MG/ML
1000 INJECTION, SOLUTION INTRAVENOUS EVERY 6 HOURS
Status: COMPLETED | OUTPATIENT
Start: 2019-02-22 | End: 2019-02-22

## 2019-02-22 RX ADMIN — FAMOTIDINE 20 MG: 20 TABLET, FILM COATED ORAL at 09:36

## 2019-02-22 RX ADMIN — DOCUSATE SODIUM 100 MG: 100 CAPSULE, LIQUID FILLED ORAL at 09:36

## 2019-02-22 RX ADMIN — IPRATROPIUM BROMIDE AND ALBUTEROL SULFATE 1 AMPULE: .5; 3 SOLUTION RESPIRATORY (INHALATION) at 11:12

## 2019-02-22 RX ADMIN — SODIUM CHLORIDE, PRESERVATIVE FREE 10 ML: 5 INJECTION INTRAVENOUS at 09:39

## 2019-02-22 RX ADMIN — FAMOTIDINE 20 MG: 20 TABLET, FILM COATED ORAL at 21:43

## 2019-02-22 RX ADMIN — SODIUM CHLORIDE: 4.5 INJECTION, SOLUTION INTRAVENOUS at 12:50

## 2019-02-22 RX ADMIN — MULTIPLE VITAMINS W/ MINERALS TAB 1 TABLET: TAB at 09:36

## 2019-02-22 RX ADMIN — IPRATROPIUM BROMIDE AND ALBUTEROL SULFATE 1 AMPULE: .5; 3 SOLUTION RESPIRATORY (INHALATION) at 15:55

## 2019-02-22 RX ADMIN — ACETAMINOPHEN 1000 MG: 10 INJECTION, SOLUTION INTRAVENOUS at 05:59

## 2019-02-22 RX ADMIN — AMIODARONE HYDROCHLORIDE 200 MG: 200 TABLET ORAL at 15:31

## 2019-02-22 RX ADMIN — ACETAMINOPHEN 1000 MG: 10 INJECTION, SOLUTION INTRAVENOUS at 00:22

## 2019-02-22 RX ADMIN — ASPIRIN 81 MG: 81 TABLET ORAL at 09:36

## 2019-02-22 RX ADMIN — IPRATROPIUM BROMIDE AND ALBUTEROL SULFATE 1 AMPULE: .5; 3 SOLUTION RESPIRATORY (INHALATION) at 20:50

## 2019-02-22 RX ADMIN — AMIODARONE HYDROCHLORIDE 200 MG: 200 TABLET ORAL at 21:43

## 2019-02-22 RX ADMIN — CEFAZOLIN SODIUM 2 G: 2 INJECTION, SOLUTION INTRAVENOUS at 03:30

## 2019-02-22 RX ADMIN — Medication: at 21:42

## 2019-02-22 RX ADMIN — IPRATROPIUM BROMIDE AND ALBUTEROL SULFATE 1 AMPULE: .5; 3 SOLUTION RESPIRATORY (INHALATION) at 07:20

## 2019-02-22 RX ADMIN — ACETAMINOPHEN 1000 MG: 10 INJECTION, SOLUTION INTRAVENOUS at 20:07

## 2019-02-22 RX ADMIN — SIMVASTATIN 10 MG: 10 TABLET, FILM COATED ORAL at 21:43

## 2019-02-22 RX ADMIN — ALBUMIN (HUMAN) 12.5 G: 12.5 INJECTION, SOLUTION INTRAVENOUS at 15:30

## 2019-02-22 RX ADMIN — CALCIUM GLUCONATE 2 G: 98 INJECTION, SOLUTION INTRAVENOUS at 07:57

## 2019-02-22 RX ADMIN — Medication: at 09:36

## 2019-02-22 RX ADMIN — DOCUSATE SODIUM 100 MG: 100 CAPSULE, LIQUID FILLED ORAL at 21:43

## 2019-02-22 RX ADMIN — CEFAZOLIN SODIUM 2 G: 2 INJECTION, SOLUTION INTRAVENOUS at 10:02

## 2019-02-22 RX ADMIN — SODIUM CHLORIDE 1 UNITS/HR: 9 INJECTION, SOLUTION INTRAVENOUS at 01:39

## 2019-02-22 RX ADMIN — SODIUM CHLORIDE, PRESERVATIVE FREE 10 ML: 5 INJECTION INTRAVENOUS at 21:42

## 2019-02-22 RX ADMIN — AMIODARONE HYDROCHLORIDE 200 MG: 200 TABLET ORAL at 09:36

## 2019-02-22 RX ADMIN — ACETAMINOPHEN 1000 MG: 10 INJECTION, SOLUTION INTRAVENOUS at 13:50

## 2019-02-22 ASSESSMENT — PULMONARY FUNCTION TESTS
PIF_VALUE: 30
PIF_VALUE: 20
PIF_VALUE: 0
PIF_VALUE: 0
PIF_VALUE: 21
PIF_VALUE: 15
PIF_VALUE: 0
PIF_VALUE: 0
PIF_VALUE: 40
PIF_VALUE: 20
PIF_VALUE: 19
PIF_VALUE: 19
PIF_VALUE: 0
PIF_VALUE: 15
PIF_VALUE: 0
PIF_VALUE: 20

## 2019-02-22 ASSESSMENT — PAIN DESCRIPTION - ONSET
ONSET: ON-GOING
ONSET: ON-GOING

## 2019-02-22 ASSESSMENT — PAIN DESCRIPTION - ORIENTATION
ORIENTATION: MID
ORIENTATION: MID

## 2019-02-22 ASSESSMENT — PAIN DESCRIPTION - DESCRIPTORS
DESCRIPTORS: DISCOMFORT
DESCRIPTORS: DISCOMFORT

## 2019-02-22 ASSESSMENT — PAIN SCALES - GENERAL
PAINLEVEL_OUTOF10: 0
PAINLEVEL_OUTOF10: 0
PAINLEVEL_OUTOF10: 4
PAINLEVEL_OUTOF10: 2
PAINLEVEL_OUTOF10: 0
PAINLEVEL_OUTOF10: 0
PAINLEVEL_OUTOF10: 7
PAINLEVEL_OUTOF10: 0
PAINLEVEL_OUTOF10: 0
PAINLEVEL_OUTOF10: 1

## 2019-02-22 ASSESSMENT — PAIN DESCRIPTION - LOCATION
LOCATION: CHEST
LOCATION: CHEST

## 2019-02-22 ASSESSMENT — PAIN DESCRIPTION - PAIN TYPE
TYPE: SURGICAL PAIN
TYPE: SURGICAL PAIN

## 2019-02-22 ASSESSMENT — PAIN DESCRIPTION - PROGRESSION
CLINICAL_PROGRESSION: GRADUALLY WORSENING
CLINICAL_PROGRESSION: GRADUALLY IMPROVING

## 2019-02-22 ASSESSMENT — PAIN DESCRIPTION - FREQUENCY
FREQUENCY: CONTINUOUS
FREQUENCY: CONTINUOUS

## 2019-02-23 ENCOUNTER — APPOINTMENT (OUTPATIENT)
Dept: GENERAL RADIOLOGY | Age: 74
DRG: 233 | End: 2019-02-23
Attending: INTERNAL MEDICINE
Payer: COMMERCIAL

## 2019-02-23 LAB
ANION GAP SERPL CALCULATED.3IONS-SCNC: 9 MMOL/L (ref 4–16)
BUN BLDV-MCNC: 14 MG/DL (ref 6–23)
CALCIUM IONIZED: 4.56 MG/DL (ref 4.48–5.28)
CALCIUM SERPL-MCNC: 8 MG/DL (ref 8.3–10.6)
CHLORIDE BLD-SCNC: 100 MMOL/L (ref 99–110)
CO2: 21 MMOL/L (ref 21–32)
CREAT SERPL-MCNC: 1 MG/DL (ref 0.6–1.1)
GFR AFRICAN AMERICAN: >60 ML/MIN/1.73M2
GFR NON-AFRICAN AMERICAN: 54 ML/MIN/1.73M2
GLUCOSE BLD-MCNC: 101 MG/DL (ref 70–99)
GLUCOSE BLD-MCNC: 108 MG/DL (ref 70–99)
GLUCOSE BLD-MCNC: 114 MG/DL (ref 70–99)
GLUCOSE BLD-MCNC: 115 MG/DL (ref 70–99)
GLUCOSE BLD-MCNC: 116 MG/DL (ref 70–99)
GLUCOSE BLD-MCNC: 118 MG/DL (ref 70–99)
GLUCOSE BLD-MCNC: 118 MG/DL (ref 70–99)
GLUCOSE BLD-MCNC: 119 MG/DL (ref 70–99)
GLUCOSE BLD-MCNC: 120 MG/DL (ref 70–99)
GLUCOSE BLD-MCNC: 120 MG/DL (ref 70–99)
GLUCOSE BLD-MCNC: 122 MG/DL (ref 70–99)
GLUCOSE BLD-MCNC: 147 MG/DL (ref 70–99)
GLUCOSE BLD-MCNC: 162 MG/DL (ref 70–99)
GLUCOSE BLD-MCNC: 162 MG/DL (ref 70–99)
GLUCOSE BLD-MCNC: 179 MG/DL (ref 70–99)
GLUCOSE BLD-MCNC: 209 MG/DL (ref 70–99)
GLUCOSE BLD-MCNC: 215 MG/DL (ref 70–99)
HCT VFR BLD CALC: 28.3 % (ref 37–47)
HEMOGLOBIN: 8.9 GM/DL (ref 12.5–16)
IONIZED CA: 1.14 MMOL/L (ref 1.12–1.32)
MAGNESIUM: 1.7 MG/DL (ref 1.8–2.4)
MAGNESIUM: 1.8 MG/DL (ref 1.8–2.4)
MCH RBC QN AUTO: 29.3 PG (ref 27–31)
MCHC RBC AUTO-ENTMCNC: 31.4 % (ref 32–36)
MCV RBC AUTO: 93.1 FL (ref 78–100)
PDW BLD-RTO: 13.9 % (ref 11.7–14.9)
PLATELET # BLD: 169 K/CU MM (ref 140–440)
PMV BLD AUTO: 12.1 FL (ref 7.5–11.1)
POTASSIUM SERPL-SCNC: 3.2 MMOL/L (ref 3.5–5.1)
POTASSIUM SERPL-SCNC: 3.2 MMOL/L (ref 3.5–5.1)
RBC # BLD: 3.04 M/CU MM (ref 4.2–5.4)
SODIUM BLD-SCNC: 130 MMOL/L (ref 135–145)
WBC # BLD: 16.4 K/CU MM (ref 4–10.5)

## 2019-02-23 PROCEDURE — 97162 PT EVAL MOD COMPLEX 30 MIN: CPT

## 2019-02-23 PROCEDURE — 6370000000 HC RX 637 (ALT 250 FOR IP): Performed by: PHYSICIAN ASSISTANT

## 2019-02-23 PROCEDURE — 71045 X-RAY EXAM CHEST 1 VIEW: CPT

## 2019-02-23 PROCEDURE — 6360000002 HC RX W HCPCS: Performed by: PHYSICIAN ASSISTANT

## 2019-02-23 PROCEDURE — 2580000003 HC RX 258: Performed by: PHYSICIAN ASSISTANT

## 2019-02-23 PROCEDURE — 83735 ASSAY OF MAGNESIUM: CPT

## 2019-02-23 PROCEDURE — 97166 OT EVAL MOD COMPLEX 45 MIN: CPT

## 2019-02-23 PROCEDURE — 2100000000 HC CCU R&B

## 2019-02-23 PROCEDURE — 99232 SBSQ HOSP IP/OBS MODERATE 35: CPT | Performed by: INTERNAL MEDICINE

## 2019-02-23 PROCEDURE — 36592 COLLECT BLOOD FROM PICC: CPT

## 2019-02-23 PROCEDURE — 94761 N-INVAS EAR/PLS OXIMETRY MLT: CPT

## 2019-02-23 PROCEDURE — 94640 AIRWAY INHALATION TREATMENT: CPT

## 2019-02-23 PROCEDURE — 82962 GLUCOSE BLOOD TEST: CPT

## 2019-02-23 PROCEDURE — 97116 GAIT TRAINING THERAPY: CPT

## 2019-02-23 PROCEDURE — 84132 ASSAY OF SERUM POTASSIUM: CPT

## 2019-02-23 PROCEDURE — 85027 COMPLETE CBC AUTOMATED: CPT

## 2019-02-23 PROCEDURE — 82330 ASSAY OF CALCIUM: CPT

## 2019-02-23 PROCEDURE — 94150 VITAL CAPACITY TEST: CPT

## 2019-02-23 PROCEDURE — 80048 BASIC METABOLIC PNL TOTAL CA: CPT

## 2019-02-23 RX ADMIN — IPRATROPIUM BROMIDE AND ALBUTEROL SULFATE 1 AMPULE: .5; 3 SOLUTION RESPIRATORY (INHALATION) at 16:19

## 2019-02-23 RX ADMIN — ACETAMINOPHEN 650 MG: 325 TABLET ORAL at 01:29

## 2019-02-23 RX ADMIN — SIMVASTATIN 10 MG: 10 TABLET, FILM COATED ORAL at 20:03

## 2019-02-23 RX ADMIN — POTASSIUM CHLORIDE 20 MEQ: 400 INJECTION, SOLUTION INTRAVENOUS at 11:07

## 2019-02-23 RX ADMIN — ONDANSETRON 4 MG: 2 INJECTION INTRAMUSCULAR; INTRAVENOUS at 12:23

## 2019-02-23 RX ADMIN — ACETAMINOPHEN 650 MG: 325 TABLET ORAL at 22:28

## 2019-02-23 RX ADMIN — SODIUM CHLORIDE, PRESERVATIVE FREE 10 ML: 5 INJECTION INTRAVENOUS at 08:17

## 2019-02-23 RX ADMIN — SODIUM CHLORIDE, PRESERVATIVE FREE 10 ML: 5 INJECTION INTRAVENOUS at 20:03

## 2019-02-23 RX ADMIN — AMIODARONE HYDROCHLORIDE 200 MG: 200 TABLET ORAL at 20:03

## 2019-02-23 RX ADMIN — DOCUSATE SODIUM 100 MG: 100 CAPSULE, LIQUID FILLED ORAL at 09:20

## 2019-02-23 RX ADMIN — MULTIPLE VITAMINS W/ MINERALS TAB 1 TABLET: TAB at 09:21

## 2019-02-23 RX ADMIN — ACETAMINOPHEN 650 MG: 325 TABLET ORAL at 09:43

## 2019-02-23 RX ADMIN — MAGNESIUM SULFATE HEPTAHYDRATE 2 G: 40 INJECTION, SOLUTION INTRAVENOUS at 18:50

## 2019-02-23 RX ADMIN — CARVEDILOL 3.12 MG: 3.12 TABLET, FILM COATED ORAL at 09:20

## 2019-02-23 RX ADMIN — DOCUSATE SODIUM 100 MG: 100 CAPSULE, LIQUID FILLED ORAL at 20:03

## 2019-02-23 RX ADMIN — AMIODARONE HYDROCHLORIDE 200 MG: 200 TABLET ORAL at 14:29

## 2019-02-23 RX ADMIN — FUROSEMIDE 20 MG: 10 INJECTION, SOLUTION INTRAVENOUS at 01:29

## 2019-02-23 RX ADMIN — Medication: at 20:03

## 2019-02-23 RX ADMIN — Medication: at 09:20

## 2019-02-23 RX ADMIN — IPRATROPIUM BROMIDE AND ALBUTEROL SULFATE 1 AMPULE: .5; 3 SOLUTION RESPIRATORY (INHALATION) at 11:42

## 2019-02-23 RX ADMIN — AMIODARONE HYDROCHLORIDE 200 MG: 200 TABLET ORAL at 09:20

## 2019-02-23 RX ADMIN — FUROSEMIDE 20 MG: 10 INJECTION, SOLUTION INTRAVENOUS at 08:17

## 2019-02-23 RX ADMIN — SODIUM CHLORIDE: 4.5 INJECTION, SOLUTION INTRAVENOUS at 21:09

## 2019-02-23 RX ADMIN — IPRATROPIUM BROMIDE AND ALBUTEROL SULFATE 1 AMPULE: .5; 3 SOLUTION RESPIRATORY (INHALATION) at 20:15

## 2019-02-23 RX ADMIN — POTASSIUM CHLORIDE 20 MEQ: 400 INJECTION, SOLUTION INTRAVENOUS at 21:10

## 2019-02-23 RX ADMIN — ACETAMINOPHEN 650 MG: 325 TABLET ORAL at 16:52

## 2019-02-23 RX ADMIN — IPRATROPIUM BROMIDE AND ALBUTEROL SULFATE 1 AMPULE: .5; 3 SOLUTION RESPIRATORY (INHALATION) at 08:09

## 2019-02-23 RX ADMIN — CALCIUM GLUCONATE 2 G: 98 INJECTION, SOLUTION INTRAVENOUS at 09:32

## 2019-02-23 RX ADMIN — ASPIRIN 81 MG: 81 TABLET ORAL at 09:19

## 2019-02-23 RX ADMIN — SODIUM CHLORIDE: 4.5 INJECTION, SOLUTION INTRAVENOUS at 03:04

## 2019-02-23 ASSESSMENT — PAIN DESCRIPTION - PROGRESSION
CLINICAL_PROGRESSION: GRADUALLY WORSENING

## 2019-02-23 ASSESSMENT — PAIN DESCRIPTION - DESCRIPTORS
DESCRIPTORS: DISCOMFORT
DESCRIPTORS: ACHING;SORE
DESCRIPTORS: DISCOMFORT

## 2019-02-23 ASSESSMENT — PAIN SCALES - GENERAL
PAINLEVEL_OUTOF10: 7
PAINLEVEL_OUTOF10: 6
PAINLEVEL_OUTOF10: 8
PAINLEVEL_OUTOF10: 2
PAINLEVEL_OUTOF10: 7
PAINLEVEL_OUTOF10: 2

## 2019-02-23 ASSESSMENT — PAIN DESCRIPTION - ONSET
ONSET: GRADUAL
ONSET: ON-GOING
ONSET: SUDDEN

## 2019-02-23 ASSESSMENT — PAIN DESCRIPTION - LOCATION
LOCATION: CHEST

## 2019-02-23 ASSESSMENT — PAIN DESCRIPTION - ORIENTATION
ORIENTATION: MID
ORIENTATION: MID

## 2019-02-23 ASSESSMENT — PAIN DESCRIPTION - PAIN TYPE
TYPE: SURGICAL PAIN

## 2019-02-23 ASSESSMENT — PAIN DESCRIPTION - FREQUENCY
FREQUENCY: CONTINUOUS
FREQUENCY: INTERMITTENT
FREQUENCY: INTERMITTENT

## 2019-02-24 ENCOUNTER — APPOINTMENT (OUTPATIENT)
Dept: GENERAL RADIOLOGY | Age: 74
DRG: 233 | End: 2019-02-24
Attending: INTERNAL MEDICINE
Payer: COMMERCIAL

## 2019-02-24 LAB
ANION GAP SERPL CALCULATED.3IONS-SCNC: 9 MMOL/L (ref 4–16)
BUN BLDV-MCNC: 13 MG/DL (ref 6–23)
CALCIUM IONIZED: 4.6 MG/DL (ref 4.48–5.28)
CALCIUM SERPL-MCNC: 8 MG/DL (ref 8.3–10.6)
CHLORIDE BLD-SCNC: 101 MMOL/L (ref 99–110)
CO2: 22 MMOL/L (ref 21–32)
CREAT SERPL-MCNC: 0.9 MG/DL (ref 0.6–1.1)
GFR AFRICAN AMERICAN: >60 ML/MIN/1.73M2
GFR NON-AFRICAN AMERICAN: >60 ML/MIN/1.73M2
GLUCOSE BLD-MCNC: 105 MG/DL (ref 70–99)
GLUCOSE BLD-MCNC: 107 MG/DL (ref 70–99)
GLUCOSE BLD-MCNC: 213 MG/DL (ref 70–99)
GLUCOSE BLD-MCNC: 90 MG/DL (ref 70–99)
GLUCOSE BLD-MCNC: 97 MG/DL (ref 70–99)
GLUCOSE BLD-MCNC: 97 MG/DL (ref 70–99)
IONIZED CA: 1.15 MMOL/L (ref 1.12–1.32)
POTASSIUM SERPL-SCNC: 3.5 MMOL/L (ref 3.5–5.1)
SODIUM BLD-SCNC: 132 MMOL/L (ref 135–145)

## 2019-02-24 PROCEDURE — 80048 BASIC METABOLIC PNL TOTAL CA: CPT

## 2019-02-24 PROCEDURE — 94761 N-INVAS EAR/PLS OXIMETRY MLT: CPT

## 2019-02-24 PROCEDURE — 71046 X-RAY EXAM CHEST 2 VIEWS: CPT

## 2019-02-24 PROCEDURE — 94640 AIRWAY INHALATION TREATMENT: CPT

## 2019-02-24 PROCEDURE — 71045 X-RAY EXAM CHEST 1 VIEW: CPT

## 2019-02-24 PROCEDURE — 82962 GLUCOSE BLOOD TEST: CPT

## 2019-02-24 PROCEDURE — 6370000000 HC RX 637 (ALT 250 FOR IP): Performed by: PHYSICIAN ASSISTANT

## 2019-02-24 PROCEDURE — 82330 ASSAY OF CALCIUM: CPT

## 2019-02-24 PROCEDURE — 2100000000 HC CCU R&B

## 2019-02-24 PROCEDURE — 6360000002 HC RX W HCPCS: Performed by: PHYSICIAN ASSISTANT

## 2019-02-24 PROCEDURE — 2580000003 HC RX 258: Performed by: PHYSICIAN ASSISTANT

## 2019-02-24 RX ADMIN — POTASSIUM CHLORIDE 20 MEQ: 400 INJECTION, SOLUTION INTRAVENOUS at 07:10

## 2019-02-24 RX ADMIN — AMIODARONE HYDROCHLORIDE 200 MG: 200 TABLET ORAL at 21:32

## 2019-02-24 RX ADMIN — DOCUSATE SODIUM 100 MG: 100 CAPSULE, LIQUID FILLED ORAL at 08:11

## 2019-02-24 RX ADMIN — IPRATROPIUM BROMIDE AND ALBUTEROL SULFATE 1 AMPULE: .5; 3 SOLUTION RESPIRATORY (INHALATION) at 11:20

## 2019-02-24 RX ADMIN — AMIODARONE HYDROCHLORIDE 200 MG: 200 TABLET ORAL at 08:11

## 2019-02-24 RX ADMIN — ACETAMINOPHEN 650 MG: 325 TABLET ORAL at 16:17

## 2019-02-24 RX ADMIN — Medication: at 21:31

## 2019-02-24 RX ADMIN — CARVEDILOL 3.12 MG: 3.12 TABLET, FILM COATED ORAL at 21:31

## 2019-02-24 RX ADMIN — METOCLOPRAMIDE 10 MG: 5 INJECTION, SOLUTION INTRAMUSCULAR; INTRAVENOUS at 08:06

## 2019-02-24 RX ADMIN — DOCUSATE SODIUM 100 MG: 100 CAPSULE, LIQUID FILLED ORAL at 21:32

## 2019-02-24 RX ADMIN — ACETAMINOPHEN 650 MG: 325 TABLET ORAL at 08:15

## 2019-02-24 RX ADMIN — IPRATROPIUM BROMIDE AND ALBUTEROL SULFATE 1 AMPULE: .5; 3 SOLUTION RESPIRATORY (INHALATION) at 20:01

## 2019-02-24 RX ADMIN — SODIUM CHLORIDE, PRESERVATIVE FREE 10 ML: 5 INJECTION INTRAVENOUS at 21:42

## 2019-02-24 RX ADMIN — IPRATROPIUM BROMIDE AND ALBUTEROL SULFATE 1 AMPULE: .5; 3 SOLUTION RESPIRATORY (INHALATION) at 07:53

## 2019-02-24 RX ADMIN — ACETAMINOPHEN 650 MG: 325 TABLET ORAL at 23:23

## 2019-02-24 RX ADMIN — IPRATROPIUM BROMIDE AND ALBUTEROL SULFATE 1 AMPULE: .5; 3 SOLUTION RESPIRATORY (INHALATION) at 15:35

## 2019-02-24 RX ADMIN — ASPIRIN 81 MG: 81 TABLET ORAL at 08:11

## 2019-02-24 RX ADMIN — SIMVASTATIN 10 MG: 10 TABLET, FILM COATED ORAL at 21:32

## 2019-02-24 RX ADMIN — CARVEDILOL 3.12 MG: 3.12 TABLET, FILM COATED ORAL at 08:12

## 2019-02-24 RX ADMIN — Medication: at 08:11

## 2019-02-24 RX ADMIN — AMIODARONE HYDROCHLORIDE 200 MG: 200 TABLET ORAL at 16:17

## 2019-02-24 RX ADMIN — ONDANSETRON 4 MG: 2 INJECTION INTRAMUSCULAR; INTRAVENOUS at 04:55

## 2019-02-24 RX ADMIN — SODIUM CHLORIDE, PRESERVATIVE FREE 10 ML: 5 INJECTION INTRAVENOUS at 16:17

## 2019-02-24 ASSESSMENT — PAIN SCALES - GENERAL
PAINLEVEL_OUTOF10: 6
PAINLEVEL_OUTOF10: 0
PAINLEVEL_OUTOF10: 6

## 2019-02-24 ASSESSMENT — PAIN DESCRIPTION - PROGRESSION
CLINICAL_PROGRESSION: RAPIDLY WORSENING
CLINICAL_PROGRESSION: GRADUALLY WORSENING

## 2019-02-24 ASSESSMENT — PAIN DESCRIPTION - DESCRIPTORS
DESCRIPTORS: HEADACHE
DESCRIPTORS: DISCOMFORT

## 2019-02-24 ASSESSMENT — PAIN DESCRIPTION - ONSET
ONSET: ON-GOING
ONSET: ON-GOING

## 2019-02-24 ASSESSMENT — PAIN DESCRIPTION - LOCATION
LOCATION: HEAD
LOCATION: CHEST

## 2019-02-24 ASSESSMENT — PAIN DESCRIPTION - PAIN TYPE
TYPE: ACUTE PAIN
TYPE: SURGICAL PAIN

## 2019-02-24 ASSESSMENT — PAIN DESCRIPTION - FREQUENCY
FREQUENCY: INTERMITTENT
FREQUENCY: CONTINUOUS

## 2019-02-24 ASSESSMENT — PAIN DESCRIPTION - ORIENTATION: ORIENTATION: MID

## 2019-02-25 ENCOUNTER — TELEPHONE (OUTPATIENT)
Dept: CARDIOLOGY CLINIC | Age: 74
End: 2019-02-25

## 2019-02-25 VITALS
SYSTOLIC BLOOD PRESSURE: 129 MMHG | DIASTOLIC BLOOD PRESSURE: 73 MMHG | WEIGHT: 163.58 LBS | HEIGHT: 61 IN | BODY MASS INDEX: 30.88 KG/M2 | RESPIRATION RATE: 14 BRPM | TEMPERATURE: 98.6 F | OXYGEN SATURATION: 100 % | HEART RATE: 73 BPM

## 2019-02-25 PROBLEM — I25.10 CORONARY ARTERY DISEASE INVOLVING NATIVE CORONARY ARTERY: Status: ACTIVE | Noted: 2019-02-25

## 2019-02-25 LAB
ANION GAP SERPL CALCULATED.3IONS-SCNC: 10 MMOL/L (ref 4–16)
BUN BLDV-MCNC: 14 MG/DL (ref 6–23)
CALCIUM SERPL-MCNC: 7.5 MG/DL (ref 8.3–10.6)
CHLORIDE BLD-SCNC: 104 MMOL/L (ref 99–110)
CO2: 22 MMOL/L (ref 21–32)
CREAT SERPL-MCNC: 0.9 MG/DL (ref 0.6–1.1)
GFR AFRICAN AMERICAN: >60 ML/MIN/1.73M2
GFR NON-AFRICAN AMERICAN: >60 ML/MIN/1.73M2
GLUCOSE BLD-MCNC: 110 MG/DL (ref 70–99)
POTASSIUM SERPL-SCNC: 3.3 MMOL/L (ref 3.5–5.1)
SODIUM BLD-SCNC: 136 MMOL/L (ref 135–145)

## 2019-02-25 PROCEDURE — 6370000000 HC RX 637 (ALT 250 FOR IP): Performed by: PHYSICIAN ASSISTANT

## 2019-02-25 PROCEDURE — 97116 GAIT TRAINING THERAPY: CPT

## 2019-02-25 PROCEDURE — 97530 THERAPEUTIC ACTIVITIES: CPT

## 2019-02-25 PROCEDURE — 36592 COLLECT BLOOD FROM PICC: CPT

## 2019-02-25 PROCEDURE — 94150 VITAL CAPACITY TEST: CPT

## 2019-02-25 PROCEDURE — 94761 N-INVAS EAR/PLS OXIMETRY MLT: CPT

## 2019-02-25 PROCEDURE — 94640 AIRWAY INHALATION TREATMENT: CPT

## 2019-02-25 PROCEDURE — 2580000003 HC RX 258: Performed by: PHYSICIAN ASSISTANT

## 2019-02-25 PROCEDURE — 80048 BASIC METABOLIC PNL TOTAL CA: CPT

## 2019-02-25 PROCEDURE — 97110 THERAPEUTIC EXERCISES: CPT

## 2019-02-25 RX ORDER — CARVEDILOL 3.12 MG/1
3.12 TABLET ORAL 2 TIMES DAILY WITH MEALS
Qty: 60 TABLET | Refills: 3 | Status: SHIPPED | OUTPATIENT
Start: 2019-02-25 | End: 2020-03-05

## 2019-02-25 RX ORDER — BISACODYL 10 MG
10 SUPPOSITORY, RECTAL RECTAL ONCE
Status: COMPLETED | OUTPATIENT
Start: 2019-02-25 | End: 2019-02-25

## 2019-02-25 RX ORDER — ASPIRIN 81 MG/1
81 TABLET ORAL DAILY
Qty: 30 TABLET | Refills: 3 | Status: SHIPPED | OUTPATIENT
Start: 2019-02-26

## 2019-02-25 RX ADMIN — IPRATROPIUM BROMIDE AND ALBUTEROL SULFATE 1 AMPULE: .5; 3 SOLUTION RESPIRATORY (INHALATION) at 11:32

## 2019-02-25 RX ADMIN — AMIODARONE HYDROCHLORIDE 200 MG: 200 TABLET ORAL at 08:29

## 2019-02-25 RX ADMIN — ACETAMINOPHEN 650 MG: 325 TABLET ORAL at 08:26

## 2019-02-25 RX ADMIN — IPRATROPIUM BROMIDE AND ALBUTEROL SULFATE 1 AMPULE: .5; 3 SOLUTION RESPIRATORY (INHALATION) at 15:48

## 2019-02-25 RX ADMIN — CARVEDILOL 3.12 MG: 3.12 TABLET, FILM COATED ORAL at 08:26

## 2019-02-25 RX ADMIN — SODIUM CHLORIDE, PRESERVATIVE FREE 10 ML: 5 INJECTION INTRAVENOUS at 08:31

## 2019-02-25 RX ADMIN — BISACODYL 10 MG: 10 SUPPOSITORY RECTAL at 09:23

## 2019-02-25 RX ADMIN — MULTIPLE VITAMINS W/ MINERALS TAB 1 TABLET: TAB at 08:27

## 2019-02-25 RX ADMIN — DOCUSATE SODIUM 100 MG: 100 CAPSULE, LIQUID FILLED ORAL at 08:27

## 2019-02-25 RX ADMIN — HYDROCODONE BITARTRATE AND ACETAMINOPHEN 1 TABLET: 5; 325 TABLET ORAL at 09:27

## 2019-02-25 RX ADMIN — IPRATROPIUM BROMIDE AND ALBUTEROL SULFATE 1 AMPULE: .5; 3 SOLUTION RESPIRATORY (INHALATION) at 08:06

## 2019-02-25 RX ADMIN — ASPIRIN 81 MG: 81 TABLET ORAL at 08:26

## 2019-02-25 ASSESSMENT — PAIN SCALES - GENERAL
PAINLEVEL_OUTOF10: 6
PAINLEVEL_OUTOF10: 0
PAINLEVEL_OUTOF10: 2
PAINLEVEL_OUTOF10: 8
PAINLEVEL_OUTOF10: 0
PAINLEVEL_OUTOF10: 3
PAINLEVEL_OUTOF10: 0

## 2019-02-25 ASSESSMENT — PAIN DESCRIPTION - ORIENTATION
ORIENTATION: MID
ORIENTATION: LOWER

## 2019-02-25 ASSESSMENT — PAIN DESCRIPTION - FREQUENCY: FREQUENCY: INTERMITTENT

## 2019-02-25 ASSESSMENT — PAIN DESCRIPTION - PAIN TYPE
TYPE: CHRONIC PAIN
TYPE: SURGICAL PAIN

## 2019-02-25 ASSESSMENT — PAIN DESCRIPTION - LOCATION
LOCATION: BACK
LOCATION: CHEST

## 2019-02-25 ASSESSMENT — PAIN DESCRIPTION - DESCRIPTORS
DESCRIPTORS: ACHING
DESCRIPTORS: ACHING

## 2019-02-26 ENCOUNTER — CARE COORDINATION (OUTPATIENT)
Dept: CASE MANAGEMENT | Age: 74
End: 2019-02-26

## 2019-02-26 ENCOUNTER — TELEPHONE (OUTPATIENT)
Dept: CARDIOLOGY CLINIC | Age: 74
End: 2019-02-26

## 2019-02-26 RX ORDER — ONDANSETRON 4 MG/1
4 TABLET, FILM COATED ORAL EVERY 8 HOURS PRN
Qty: 30 TABLET | Refills: 0 | Status: SHIPPED | OUTPATIENT
Start: 2019-02-26 | End: 2019-08-01

## 2019-02-28 ENCOUNTER — TELEPHONE (OUTPATIENT)
Dept: CARDIOLOGY CLINIC | Age: 74
End: 2019-02-28

## 2019-03-01 ENCOUNTER — OFFICE VISIT (OUTPATIENT)
Dept: CARDIOLOGY CLINIC | Age: 74
End: 2019-03-01
Payer: COMMERCIAL

## 2019-03-01 VITALS
WEIGHT: 164.4 LBS | DIASTOLIC BLOOD PRESSURE: 60 MMHG | SYSTOLIC BLOOD PRESSURE: 130 MMHG | HEIGHT: 61 IN | HEART RATE: 75 BPM | BODY MASS INDEX: 31.04 KG/M2

## 2019-03-01 DIAGNOSIS — R00.2 PALPITATIONS: ICD-10-CM

## 2019-03-01 DIAGNOSIS — Z95.1 S/P CABG X 1: Primary | ICD-10-CM

## 2019-03-01 PROCEDURE — 99214 OFFICE O/P EST MOD 30 MIN: CPT | Performed by: INTERNAL MEDICINE

## 2019-03-01 PROCEDURE — 93000 ELECTROCARDIOGRAM COMPLETE: CPT | Performed by: INTERNAL MEDICINE

## 2019-03-01 RX ORDER — DOCUSATE SODIUM 100 MG/1
100 CAPSULE, LIQUID FILLED ORAL 2 TIMES DAILY
COMMUNITY
End: 2020-08-17

## 2019-03-01 RX ORDER — AMOXICILLIN AND CLAVULANATE POTASSIUM 875; 125 MG/1; MG/1
1 TABLET, FILM COATED ORAL 2 TIMES DAILY
Qty: 14 TABLET | Refills: 0 | Status: SHIPPED | OUTPATIENT
Start: 2019-03-01 | End: 2019-03-04

## 2019-03-04 ENCOUNTER — TELEPHONE (OUTPATIENT)
Dept: CARDIOLOGY CLINIC | Age: 74
End: 2019-03-04

## 2019-03-05 ENCOUNTER — CARE COORDINATION (OUTPATIENT)
Dept: CASE MANAGEMENT | Age: 74
End: 2019-03-05

## 2019-03-06 ENCOUNTER — CARE COORDINATION (OUTPATIENT)
Dept: CASE MANAGEMENT | Age: 74
End: 2019-03-06

## 2019-03-07 ENCOUNTER — CARE COORDINATION (OUTPATIENT)
Dept: CASE MANAGEMENT | Age: 74
End: 2019-03-07

## 2019-03-11 ENCOUNTER — TELEPHONE (OUTPATIENT)
Dept: CARDIOLOGY CLINIC | Age: 74
End: 2019-03-11

## 2019-03-11 ENCOUNTER — PROCEDURE VISIT (OUTPATIENT)
Dept: CARDIOLOGY CLINIC | Age: 74
End: 2019-03-11

## 2019-03-11 DIAGNOSIS — Z95.1 S/P CABG X 1: Primary | ICD-10-CM

## 2019-03-11 DIAGNOSIS — R00.2 PALPITATIONS: ICD-10-CM

## 2019-03-12 ENCOUNTER — CARE COORDINATION (OUTPATIENT)
Dept: CASE MANAGEMENT | Age: 74
End: 2019-03-12

## 2019-03-18 ENCOUNTER — CARE COORDINATION (OUTPATIENT)
Dept: CASE MANAGEMENT | Age: 74
End: 2019-03-18

## 2019-03-18 DIAGNOSIS — Z95.1 S/P CABG (CORONARY ARTERY BYPASS GRAFT): Primary | ICD-10-CM

## 2019-03-18 DIAGNOSIS — R42 DIZZINESS: Primary | ICD-10-CM

## 2019-03-18 RX ORDER — MECLIZINE HYDROCHLORIDE 25 MG/1
25 TABLET ORAL 3 TIMES DAILY PRN
Qty: 90 TABLET | Refills: 2 | Status: SHIPPED | OUTPATIENT
Start: 2019-03-18 | End: 2020-08-17

## 2019-03-20 ENCOUNTER — TELEPHONE (OUTPATIENT)
Dept: CARDIOLOGY CLINIC | Age: 74
End: 2019-03-20

## 2019-03-21 ENCOUNTER — CARE COORDINATION (OUTPATIENT)
Dept: CASE MANAGEMENT | Age: 74
End: 2019-03-21

## 2019-03-27 ENCOUNTER — TELEPHONE (OUTPATIENT)
Dept: CARDIOLOGY CLINIC | Age: 74
End: 2019-03-27

## 2019-04-03 ENCOUNTER — TELEPHONE (OUTPATIENT)
Dept: CARDIOLOGY CLINIC | Age: 74
End: 2019-04-03

## 2019-04-03 NOTE — TELEPHONE ENCOUNTER
Placed call to Dr. Remi Mathew office at ph# [phone], spoke with Josh Fung who advised that they have reached out to patient but patient has not returned call.

## 2019-04-08 ENCOUNTER — PROCEDURE VISIT (OUTPATIENT)
Dept: CARDIOLOGY CLINIC | Age: 74
End: 2019-04-08
Payer: COMMERCIAL

## 2019-04-08 VITALS
HEIGHT: 61 IN | WEIGHT: 164 LBS | BODY MASS INDEX: 30.96 KG/M2 | DIASTOLIC BLOOD PRESSURE: 70 MMHG | SYSTOLIC BLOOD PRESSURE: 132 MMHG | HEART RATE: 99 BPM

## 2019-04-08 DIAGNOSIS — Z95.1 S/P CABG (CORONARY ARTERY BYPASS GRAFT): Primary | ICD-10-CM

## 2019-04-08 PROCEDURE — 93015 CV STRESS TEST SUPVJ I&R: CPT | Performed by: INTERNAL MEDICINE

## 2019-05-01 ENCOUNTER — HOSPITAL ENCOUNTER (OUTPATIENT)
Dept: CARDIAC REHAB | Age: 74
Setting detail: THERAPIES SERIES
Discharge: HOME | End: 2019-05-01
Payer: COMMERCIAL

## 2019-05-01 PROCEDURE — 93798 PHYS/QHP OP CAR RHAB W/ECG: CPT

## 2019-05-06 ENCOUNTER — APPOINTMENT (OUTPATIENT)
Dept: CARDIAC REHAB | Age: 74
End: 2019-05-06
Payer: COMMERCIAL

## 2019-05-07 ENCOUNTER — APPOINTMENT (OUTPATIENT)
Dept: CARDIAC REHAB | Age: 74
End: 2019-05-07
Payer: COMMERCIAL

## 2019-05-13 ENCOUNTER — APPOINTMENT (OUTPATIENT)
Dept: CARDIAC REHAB | Age: 74
End: 2019-05-13
Payer: COMMERCIAL

## 2019-05-14 ENCOUNTER — APPOINTMENT (OUTPATIENT)
Dept: CARDIAC REHAB | Age: 74
End: 2019-05-14
Payer: COMMERCIAL

## 2019-05-16 ENCOUNTER — APPOINTMENT (OUTPATIENT)
Dept: CARDIAC REHAB | Age: 74
End: 2019-05-16
Payer: COMMERCIAL

## 2019-05-21 ENCOUNTER — APPOINTMENT (OUTPATIENT)
Dept: CARDIAC REHAB | Age: 74
End: 2019-05-21
Payer: COMMERCIAL

## 2019-05-23 ENCOUNTER — APPOINTMENT (OUTPATIENT)
Dept: CARDIAC REHAB | Age: 74
End: 2019-05-23
Payer: COMMERCIAL

## 2019-05-27 ENCOUNTER — APPOINTMENT (OUTPATIENT)
Dept: CARDIAC REHAB | Age: 74
End: 2019-05-27
Payer: COMMERCIAL

## 2019-05-28 ENCOUNTER — APPOINTMENT (OUTPATIENT)
Dept: CARDIAC REHAB | Age: 74
End: 2019-05-28
Payer: COMMERCIAL

## 2019-05-30 ENCOUNTER — APPOINTMENT (OUTPATIENT)
Dept: CARDIAC REHAB | Age: 74
End: 2019-05-30
Payer: COMMERCIAL

## 2019-06-03 ENCOUNTER — APPOINTMENT (OUTPATIENT)
Dept: CARDIAC REHAB | Age: 74
End: 2019-06-03
Payer: COMMERCIAL

## 2019-06-04 ENCOUNTER — APPOINTMENT (OUTPATIENT)
Dept: CARDIAC REHAB | Age: 74
End: 2019-06-04
Payer: COMMERCIAL

## 2019-06-06 ENCOUNTER — APPOINTMENT (OUTPATIENT)
Dept: CARDIAC REHAB | Age: 74
End: 2019-06-06
Payer: COMMERCIAL

## 2019-06-10 ENCOUNTER — APPOINTMENT (OUTPATIENT)
Dept: CARDIAC REHAB | Age: 74
End: 2019-06-10
Payer: COMMERCIAL

## 2019-06-11 ENCOUNTER — APPOINTMENT (OUTPATIENT)
Dept: CARDIAC REHAB | Age: 74
End: 2019-06-11
Payer: COMMERCIAL

## 2019-06-13 ENCOUNTER — APPOINTMENT (OUTPATIENT)
Dept: CARDIAC REHAB | Age: 74
End: 2019-06-13
Payer: COMMERCIAL

## 2019-06-17 ENCOUNTER — APPOINTMENT (OUTPATIENT)
Dept: CARDIAC REHAB | Age: 74
End: 2019-06-17
Payer: COMMERCIAL

## 2019-06-18 ENCOUNTER — APPOINTMENT (OUTPATIENT)
Dept: CARDIAC REHAB | Age: 74
End: 2019-06-18
Payer: COMMERCIAL

## 2019-06-20 ENCOUNTER — APPOINTMENT (OUTPATIENT)
Dept: CARDIAC REHAB | Age: 74
End: 2019-06-20
Payer: COMMERCIAL

## 2019-06-24 ENCOUNTER — HOSPITAL ENCOUNTER (OUTPATIENT)
Dept: CARDIAC REHAB | Age: 74
Setting detail: THERAPIES SERIES
Discharge: HOME | End: 2019-06-24
Payer: COMMERCIAL

## 2019-06-24 ENCOUNTER — APPOINTMENT (OUTPATIENT)
Dept: CARDIAC REHAB | Age: 74
End: 2019-06-24
Payer: COMMERCIAL

## 2019-06-24 PROCEDURE — 93798 PHYS/QHP OP CAR RHAB W/ECG: CPT

## 2019-06-25 ENCOUNTER — APPOINTMENT (OUTPATIENT)
Dept: CARDIAC REHAB | Age: 74
End: 2019-06-25
Payer: COMMERCIAL

## 2019-06-25 ENCOUNTER — HOSPITAL ENCOUNTER (OUTPATIENT)
Dept: CARDIAC REHAB | Age: 74
Setting detail: THERAPIES SERIES
Discharge: HOME | End: 2019-06-25
Payer: COMMERCIAL

## 2019-06-25 PROCEDURE — 93798 PHYS/QHP OP CAR RHAB W/ECG: CPT

## 2019-06-27 ENCOUNTER — APPOINTMENT (OUTPATIENT)
Dept: CARDIAC REHAB | Age: 74
End: 2019-06-27
Payer: COMMERCIAL

## 2019-07-01 ENCOUNTER — APPOINTMENT (OUTPATIENT)
Dept: CARDIAC REHAB | Age: 74
End: 2019-07-01
Payer: COMMERCIAL

## 2019-07-01 ENCOUNTER — HOSPITAL ENCOUNTER (OUTPATIENT)
Dept: CARDIAC REHAB | Age: 74
Setting detail: THERAPIES SERIES
Discharge: HOME | End: 2019-07-01
Payer: COMMERCIAL

## 2019-07-01 PROCEDURE — 93798 PHYS/QHP OP CAR RHAB W/ECG: CPT

## 2019-07-02 ENCOUNTER — APPOINTMENT (OUTPATIENT)
Dept: CARDIAC REHAB | Age: 74
End: 2019-07-02
Payer: COMMERCIAL

## 2019-07-04 ENCOUNTER — APPOINTMENT (OUTPATIENT)
Dept: CARDIAC REHAB | Age: 74
End: 2019-07-04
Payer: COMMERCIAL

## 2019-07-08 ENCOUNTER — HOSPITAL ENCOUNTER (OUTPATIENT)
Dept: CARDIAC REHAB | Age: 74
Setting detail: THERAPIES SERIES
End: 2019-07-08
Payer: COMMERCIAL

## 2019-07-08 ENCOUNTER — APPOINTMENT (OUTPATIENT)
Dept: CARDIAC REHAB | Age: 74
End: 2019-07-08
Payer: COMMERCIAL

## 2019-07-09 ENCOUNTER — APPOINTMENT (OUTPATIENT)
Dept: CARDIAC REHAB | Age: 74
End: 2019-07-09
Payer: COMMERCIAL

## 2019-07-11 ENCOUNTER — APPOINTMENT (OUTPATIENT)
Dept: CARDIAC REHAB | Age: 74
End: 2019-07-11
Payer: COMMERCIAL

## 2019-07-15 ENCOUNTER — APPOINTMENT (OUTPATIENT)
Dept: CARDIAC REHAB | Age: 74
End: 2019-07-15
Payer: COMMERCIAL

## 2019-07-16 ENCOUNTER — APPOINTMENT (OUTPATIENT)
Dept: CARDIAC REHAB | Age: 74
End: 2019-07-16
Payer: COMMERCIAL

## 2019-07-18 ENCOUNTER — APPOINTMENT (OUTPATIENT)
Dept: CARDIAC REHAB | Age: 74
End: 2019-07-18
Payer: COMMERCIAL

## 2019-07-22 ENCOUNTER — APPOINTMENT (OUTPATIENT)
Dept: CARDIAC REHAB | Age: 74
End: 2019-07-22
Payer: COMMERCIAL

## 2019-07-23 ENCOUNTER — APPOINTMENT (OUTPATIENT)
Dept: CARDIAC REHAB | Age: 74
End: 2019-07-23
Payer: COMMERCIAL

## 2019-07-25 ENCOUNTER — APPOINTMENT (OUTPATIENT)
Dept: CARDIAC REHAB | Age: 74
End: 2019-07-25
Payer: COMMERCIAL

## 2019-07-29 ENCOUNTER — APPOINTMENT (OUTPATIENT)
Dept: CARDIAC REHAB | Age: 74
End: 2019-07-29
Payer: COMMERCIAL

## 2019-07-30 ENCOUNTER — APPOINTMENT (OUTPATIENT)
Dept: CARDIAC REHAB | Age: 74
End: 2019-07-30
Payer: COMMERCIAL

## 2019-08-01 ENCOUNTER — OFFICE VISIT (OUTPATIENT)
Dept: CARDIOLOGY CLINIC | Age: 74
End: 2019-08-01
Payer: COMMERCIAL

## 2019-08-01 VITALS
WEIGHT: 143.4 LBS | HEIGHT: 61 IN | HEART RATE: 64 BPM | BODY MASS INDEX: 27.08 KG/M2 | DIASTOLIC BLOOD PRESSURE: 78 MMHG | SYSTOLIC BLOOD PRESSURE: 120 MMHG

## 2019-08-01 DIAGNOSIS — I25.10 CORONARY ARTERY DISEASE INVOLVING NATIVE CORONARY ARTERY OF NATIVE HEART WITHOUT ANGINA PECTORIS: Primary | ICD-10-CM

## 2019-08-01 PROCEDURE — 99214 OFFICE O/P EST MOD 30 MIN: CPT | Performed by: INTERNAL MEDICINE

## 2020-01-07 LAB
ALBUMIN SERPL-MCNC: 4.1 G/DL
ALP BLD-CCNC: 69 U/L
ALT SERPL-CCNC: 21 U/L
ANION GAP SERPL CALCULATED.3IONS-SCNC: NORMAL MMOL/L
AST SERPL-CCNC: 41 U/L
AVERAGE GLUCOSE: NORMAL
BILIRUB SERPL-MCNC: 0.4 MG/DL (ref 0.1–1.4)
BUN BLDV-MCNC: 24 MG/DL
CALCIUM SERPL-MCNC: 9.4 MG/DL
CHLORIDE BLD-SCNC: 102 MMOL/L
CHOLESTEROL, TOTAL: 164 MG/DL
CHOLESTEROL/HDL RATIO: ABNORMAL
CO2: 22 MMOL/L
CREAT SERPL-MCNC: 1.05 MG/DL
GFR CALCULATED: 52
GLUCOSE BLD-MCNC: 85 MG/DL
HBA1C MFR BLD: 5.7 %
HDLC SERPL-MCNC: 74 MG/DL (ref 35–70)
LDL CHOLESTEROL CALCULATED: 72 MG/DL (ref 0–160)
POTASSIUM SERPL-SCNC: 4.1 MMOL/L
SODIUM BLD-SCNC: 139 MMOL/L
TOTAL PROTEIN: 6.5
TRIGL SERPL-MCNC: 88 MG/DL
VLDLC SERPL CALC-MCNC: 18 MG/DL

## 2020-03-05 ENCOUNTER — TELEPHONE (OUTPATIENT)
Dept: CARDIOLOGY CLINIC | Age: 75
End: 2020-03-05

## 2020-03-05 ENCOUNTER — OFFICE VISIT (OUTPATIENT)
Dept: CARDIOLOGY CLINIC | Age: 75
End: 2020-03-05
Payer: COMMERCIAL

## 2020-03-05 VITALS
SYSTOLIC BLOOD PRESSURE: 138 MMHG | WEIGHT: 157.2 LBS | HEIGHT: 61 IN | DIASTOLIC BLOOD PRESSURE: 78 MMHG | BODY MASS INDEX: 29.68 KG/M2 | HEART RATE: 56 BPM

## 2020-03-05 PROCEDURE — 93000 ELECTROCARDIOGRAM COMPLETE: CPT | Performed by: INTERNAL MEDICINE

## 2020-03-05 PROCEDURE — 99214 OFFICE O/P EST MOD 30 MIN: CPT | Performed by: INTERNAL MEDICINE

## 2020-03-05 RX ORDER — CARVEDILOL 12.5 MG/1
12.5 TABLET ORAL 2 TIMES DAILY
COMMUNITY
Start: 2020-01-13

## 2020-03-05 RX ORDER — LISINOPRIL 10 MG/1
TABLET ORAL
Status: ON HOLD | COMMUNITY
Start: 2020-01-15 | End: 2021-06-22 | Stop reason: HOSPADM

## 2020-03-05 RX ORDER — FUROSEMIDE 20 MG/1
20 TABLET ORAL 2 TIMES DAILY
Qty: 60 TABLET | Refills: 3 | Status: SHIPPED | OUTPATIENT
Start: 2020-03-05 | End: 2020-06-22

## 2020-03-05 RX ORDER — POTASSIUM CHLORIDE 20 MEQ/1
20 TABLET, EXTENDED RELEASE ORAL DAILY
Qty: 180 TABLET | Refills: 1 | Status: SHIPPED | OUTPATIENT
Start: 2020-03-05

## 2020-03-05 NOTE — PROGRESS NOTES
Jesse Mills MD        OFFICE  FOLLOWUP NOTE    Chief complaints: patient is here for management of CAD, cab LIMA TO LAD,Hyperlipidemia; Hypertension;Sjoegren syndrome, lymphedema, mild AS  Subjective: patient feels better, no chest pain, no shortness of breath, no dizziness, no palpitations    CAROLINE Mak is a 66 y. o.year old who  has a past medical history of CAD (coronary artery disease), COPD (chronic obstructive pulmonary disease) ([facility] [address] History of , History of cardiac cath, History of cardiac monitoring, History of exercise stress test, Hx of CABG, Hyperlipidemia, Hypertension, and Sjoegren syndrome. and presents for management of CAD, cab LIMA TO LAD,Hyperlipidemia; Hypertension;Sjoegren syndrome, lymphedema, mild AS which are well controlled  Patient was told that she is getting beginning of COPD, she is walking on the treadmill one mile a day with 2.4 miles/hr    Current Outpatient Medications   Medication Sig Dispense Refill    lisinopril (PRINIVIL;ZESTRIL) 10 MG tablet       carvedilol (COREG) 12.5 MG tablet       docusate sodium (COLACE) 100 MG capsule Take 100 mg by mouth 2 times daily      aspirin 81 MG EC tablet Take 1 tablet by mouth daily 30 tablet 3    Iron-Vitamins (GERITOL COMPLETE PO) Take 1 tablet by mouth daily      Omega 3 1000 MG CAPS Take 1,000 mg by mouth 2 times daily      meclizine (ANTIVERT) 25 MG tablet Take 1 tablet by mouth 3 times daily as needed for Dizziness or Nausea (Patient not taking: Reported on 3/5/2020) 90 tablet 2     No current facility-administered medications for this visit. Allergies: Bactrim [sulfamethoxazole-trimethoprim] and Sulfa antibiotics  Past Medical History:   Diagnosis Date    CAD (coronary artery disease)     COPD (chronic obstructive pulmonary disease) ([facility] [address] 2018    Begings of COPD as of today.     History of      History of cardiac cath 2019    severe LAD, multiple spot stenosis, mid LAD long segment    History of cardiac monitoring 2018    Sinus Rhythm with PSVT    History of exercise stress test 2019    treadmill, due to Dyspnea, Fatigue.  Hx of CABG 02/21/2019    X 2    Hyperlipidemia     Hypertension     Sjoegren syndrome      Past Surgical History:   Procedure Laterality Date    APPENDECTOMY      CORONARY ARTERY BYPASS GRAFT  2019    CABG x2 LIMA to LAD & LIMA to DIAG    CORONARY ARTERY BYPASS GRAFT N/A 2019    CABG CORONARY ARTERY BYPASS, endoscopic saphenous vein harvesting of the left leg, intraoperative KEEGAN performed by [address] [place] [address] MD Luis at [address] [facility]       Family History   Problem Relation Age of Onset    Alzheimer's Disease Mother     Diabetes Father     Heart Attack Father     Heart Attack Sister     Diabetes Sister      Social History     Tobacco Use    Smoking status: Former Smoker     Types: Cigarettes     Last attempt to quit: 1985     Years since quittin.1    Smokeless tobacco: Never Used   Substance Use Topics    Alcohol use: No      [unfilled]  Review of Systems:   · Constitutional: No Fever or Weight Loss   · Eyes: No Decreased Vision  · ENT: No Headaches, Hearing Loss or Vertigo  · Cardiovascular: No chest pain, dyspnea on exertion, palpitations or loss of consciousness  · Respiratory: No cough or wheezing    · Gastrointestinal: No abdominal pain, appetite loss, blood in stools, constipation, diarrhea or heartburn  · Genitourinary: No dysuria, trouble voiding, or hematuria  · Musculoskeletal:  No gait disturbance, weakness or joint complaints  · Integumentary: No rash or pruritis  · Neurological: No TIA or stroke symptoms  · Psychiatric: No anxiety or depression  · Endocrine: No malaise, fatigue or temperature intolerance  · Hematologic/Lymphatic: No bleeding problems, blood clots or swollen lymph nodes  · Allergic/Immunologic: No nasal congestion or hives  All systems negative except as marked. results found for: LABA1C  Lab Results   Component Value Date    CHOL 189 2015    TRIG 84 2015    HDL 77 2015    LDLDIRECT 109 (H) 2015     Lab Results   Component Value Date    ALT 16 2017    AST 36 2017     TSH:  No results found for: TSH    Impression:  [facility] is a 66 y. o.year old who  has a past medical history of CAD (coronary artery disease), COPD (chronic obstructive pulmonary disease) ([address] History of , History of cardiac cath, History of cardiac monitoring, History of exercise stress test, Hx of CABG, Hyperlipidemia, Hypertension, and Sjoegren syndrome. and presents with     Plan:  1. CAD: excellent,no angina, she does 1 mile per day treadmill and feels better, h/o  CABG koenig TO LAD, patient had dissection of LAD WITH angioplasty attempt, Continue aspirin,betablockers, statins, stress test was normal  2. Murmur: she had mild AS on echo in 2017, will repeat it  3. Leg swelling: she claims to have lymphedema, will get venous dopper report from PMD, and add lasix 20mg daily, add kcl 20meq as her last K was low  4. Dyslipidemia: continue statins,  5. HTN: stable, continue coreg medications  6. sjogren syndrome: stable  7. Lymphedema: continue compression socks  8. Health maintenance: exerise and diet  All labs, medications and tests reviewed, continue all other medications of all above medical condition listed as is.     [unfilled]

## 2020-03-05 NOTE — PATIENT INSTRUCTIONS
Please hold on to these instructions the  will call you within 1-9 business days when we receive authorization from your insurance. Echocardiogram    WHAT TO EXPECT:   ? This test will take approximately 45 minutes. ? It is an ultrasound of the heart. ? It can look at the valves and chambers inside the heart   ? There is no special instructions for this test.     If you are unable to keep this appointment, please notify us 24 hours prior to test at [phone].

## 2020-03-05 NOTE — LETTER
Alex Nagel  [address]  [facility], [address] [facility],[address]  Phone: [phone]    Fax [phone]                  Monika Rodriguez MD, Karie Elliott MD, Js Barrett MD, MD Partha Gabriel MD Curlene Silos, MD Mirta Fanny, APRN-CNP   Briseida Westbrook, APRN-CNP  Claudia Ray, APRN-CNP    Gunner Madden, APRN-CNP    Cardiac Risk Assessment   3/6/2020        Surgery Date: Pending  Surgery: Colonoscopy  Anesthesia Type: Propofol  Fax Number: [phone]    To: Dr. Kelsey Billy  From : Juanita Callejas MD, [facility]    Patient Name: Dio Muniz     YOB: 1945  MRN: [id number]    Dio Muniz was evaluated from a cardiac standpoint for her surgery or procedure and based on her history, diagnosis, recent cardiac testing, she is considered a moderate risk candidate for any marla-operative cardiac complications. Antiplatelet/anticoagulant therapy can be held prior to the surgery or procedure at the discretion of the surgeon to be resumed as soon as possible if held. Please call with any further questions.     Respectfully,          Rowan Christianson MD, MA/lisas

## 2020-03-06 ENCOUNTER — TELEPHONE (OUTPATIENT)
Dept: CARDIOLOGY CLINIC | Age: 75
End: 2020-03-06

## 2020-03-06 NOTE — TELEPHONE ENCOUNTER
Called patient to schedule echo/Lower Ext Venous Jose . Left message for patient to return call. Authorization [facility] NO auth needed . Weight 157lbs. Patient will need to hold nothing needs held for this testing .

## 2020-06-01 ENCOUNTER — PROCEDURE VISIT (OUTPATIENT)
Dept: CARDIOLOGY CLINIC | Age: 75
End: 2020-06-01
Payer: COMMERCIAL

## 2020-06-01 LAB
LV EF: 58 %
LVEF MODALITY: NORMAL

## 2020-06-01 PROCEDURE — 93306 TTE W/DOPPLER COMPLETE: CPT | Performed by: INTERNAL MEDICINE

## 2020-06-01 PROCEDURE — 93970 EXTREMITY STUDY: CPT | Performed by: INTERNAL MEDICINE

## 2020-06-09 ENCOUNTER — TELEPHONE (OUTPATIENT)
Dept: CARDIOLOGY CLINIC | Age: 75
End: 2020-06-09

## 2020-06-22 RX ORDER — FUROSEMIDE 20 MG/1
20 TABLET ORAL 2 TIMES DAILY
Qty: 180 TABLET | Refills: 1 | Status: SHIPPED | OUTPATIENT
Start: 2020-06-22

## 2020-06-29 ENCOUNTER — HOSPITAL ENCOUNTER (OUTPATIENT)
Dept: WOMENS IMAGING | Age: 75
Discharge: HOME | End: 2020-06-29
Payer: COMMERCIAL

## 2020-06-29 PROCEDURE — 77067 SCR MAMMO BI INCL CAD: CPT

## 2020-06-29 PROCEDURE — 77080 DXA BONE DENSITY AXIAL: CPT

## 2020-07-06 ENCOUNTER — HOSPITAL ENCOUNTER (OUTPATIENT)
Dept: WOMENS IMAGING | Age: 75
Discharge: HOME | End: 2020-07-06
Payer: COMMERCIAL

## 2020-07-06 ENCOUNTER — HOSPITAL ENCOUNTER (OUTPATIENT)
Dept: ULTRASOUND IMAGING | Age: 75
Discharge: HOME | End: 2020-07-06
Payer: COMMERCIAL

## 2020-07-06 PROCEDURE — 76642 ULTRASOUND BREAST LIMITED: CPT

## 2020-07-06 PROCEDURE — 77065 DX MAMMO INCL CAD UNI: CPT

## 2020-08-17 ENCOUNTER — OFFICE VISIT (OUTPATIENT)
Dept: CARDIOLOGY CLINIC | Age: 75
End: 2020-08-17
Payer: COMMERCIAL

## 2020-08-17 VITALS
SYSTOLIC BLOOD PRESSURE: 116 MMHG | HEIGHT: 61 IN | BODY MASS INDEX: 28.62 KG/M2 | DIASTOLIC BLOOD PRESSURE: 61 MMHG | WEIGHT: 151.6 LBS | HEART RATE: 61 BPM

## 2020-08-17 PROCEDURE — 99214 OFFICE O/P EST MOD 30 MIN: CPT | Performed by: INTERNAL MEDICINE

## 2020-08-17 RX ORDER — ROSUVASTATIN CALCIUM 40 MG/1
40 TABLET, COATED ORAL EVERY EVENING
Qty: 30 TABLET | Refills: 3
Start: 2020-08-17

## 2020-08-17 NOTE — LETTER
Zeina Ruelas  1945  [id number]    Have you had any Chest Pain - No      Have you had any Shortness of Breath - No     Have you had any dizziness - Yes  If Yes DO ORTHOSTATIC BP - when do you feel dizzy Pt states it just comes on whenever   How long does it last Seconds     Have you had any palpitations - No      Do you have any edema - swelling in legs    If Yes - CHECK TO SEE IF THE EDEMA IS PITTING  How long have they been having edema - Years  If Yes - Have they worn compression stockings Yes      Do you have a surgery or procedure scheduled in the near future - No
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
## 2021-02-22 ENCOUNTER — OFFICE VISIT (OUTPATIENT)
Dept: CARDIOLOGY CLINIC | Age: 76
End: 2021-02-22
Payer: COMMERCIAL

## 2021-02-22 ENCOUNTER — NURSE ONLY (OUTPATIENT)
Dept: CARDIOLOGY CLINIC | Age: 76
End: 2021-02-22
Payer: COMMERCIAL

## 2021-02-22 VITALS
DIASTOLIC BLOOD PRESSURE: 70 MMHG | HEART RATE: 58 BPM | HEIGHT: 61 IN | TEMPERATURE: 96.9 F | BODY MASS INDEX: 27.75 KG/M2 | WEIGHT: 147 LBS | SYSTOLIC BLOOD PRESSURE: 122 MMHG

## 2021-02-22 DIAGNOSIS — Z95.1 S/P CABG (CORONARY ARTERY BYPASS GRAFT): Primary | ICD-10-CM

## 2021-02-22 DIAGNOSIS — I47.29 NSVT (NONSUSTAINED VENTRICULAR TACHYCARDIA): ICD-10-CM

## 2021-02-22 DIAGNOSIS — I47.29 NSVT (NONSUSTAINED VENTRICULAR TACHYCARDIA): Primary | ICD-10-CM

## 2021-02-22 PROCEDURE — 93000 ELECTROCARDIOGRAM COMPLETE: CPT | Performed by: INTERNAL MEDICINE

## 2021-02-22 PROCEDURE — 99214 OFFICE O/P EST MOD 30 MIN: CPT | Performed by: INTERNAL MEDICINE

## 2021-02-22 PROCEDURE — 93228 REMOTE 30 DAY ECG REV/REPORT: CPT | Performed by: INTERNAL MEDICINE
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
## 2021-02-22 NOTE — LETTER
Re[address] De [address]  1945  [id number]    Have you had any Chest Pain that is not new? - Yes  If Yes DO EKG - How does it feel - Tightness   How long does the pain last - 2 seconds    How long have you been having the pain - Weeks      Have you had any Shortness of Breath - Yes  If Yes - When with palpitations    Have you had any dizziness - Yes  If Yes DO ORTHOSTATIC BP - when do you feel dizzy with heart racing   How long does it last .2  minutes     ? Sitting wait 5 minutes do supine (laying down) wait 5 minutes then do standing - log each in \"vitals\" area in Epic  ? Be sure to ask what symptoms they are having if they get dizzy while completing ortho stats such as room spinning, nausea, etc.    Have you had any palpitations that are not new? - Yes  If Yes DO EKG - Do you feel your heart racing  How long does it last - .1  minutes     Is the patient on any of the following medications -   If Yes DO EKG - Needs done every 6 months    Do you have any edema - swelling in ankles and feet    If Yes - CHECK TO SEE IF THE EDEMA IS PITTING  How long have they been having edema - Years  If Yes - Have they worn compression stockings Yes    Do you have a surgery or procedure scheduled in the near future - No    ? Ask patient if they want to sign up for [facility] if they are not already signed up    ? Check to see if we have an E-MAIL on file for the patient    ? Check medication list thoroughly!!! AND RECONCILE OUTSIDE MEDICATIONS  If dose has changed change the entire order not just the MG  BE SURE TO ASK PATIENT IF THEY NEED MEDICATION REFILLS    ?  At check out add to every patient's \"wrap up\" the following dot phrase AFTERHOURSEDUCATION and ensure we explain this to our patients

## 2021-02-22 NOTE — PROGRESS NOTES
30 days e-cardio monitor placed.  # [id number]. Instructed patient on how to record the event and to call monitoring center at [phone] if any problems arise. Instructed patient to disconnect the lead wires from the electrodes before bathing or showering and reattach them afterwards. Instructed patient that the electrodes should be changed every 3 days or if they no longer adhere to the skin. Patient to mail package after the monitor has ended. Patient verbalized understanding.

## 2021-03-11 ENCOUNTER — HOSPITAL ENCOUNTER (OUTPATIENT)
Dept: ULTRASOUND IMAGING | Age: 76
Discharge: HOME | End: 2021-03-11
Payer: COMMERCIAL

## 2021-03-11 ENCOUNTER — HOSPITAL ENCOUNTER (OUTPATIENT)
Dept: WOMENS IMAGING | Age: 76
Discharge: HOME | End: 2021-03-11
Payer: COMMERCIAL

## 2021-03-11 DIAGNOSIS — R92.8 ABNORMAL MAMMOGRAM: ICD-10-CM

## 2021-03-11 PROCEDURE — 76642 ULTRASOUND BREAST LIMITED: CPT

## 2021-04-19 ENCOUNTER — TELEPHONE (OUTPATIENT)
Dept: CARDIOLOGY CLINIC | Age: 76
End: 2021-04-19

## 2021-05-17 ENCOUNTER — OFFICE VISIT (OUTPATIENT)
Dept: CARDIOLOGY CLINIC | Age: 76
End: 2021-05-17
Payer: COMMERCIAL

## 2021-05-17 VITALS
BODY MASS INDEX: 27.78 KG/M2 | HEART RATE: 54 BPM | HEIGHT: 61 IN | DIASTOLIC BLOOD PRESSURE: 68 MMHG | SYSTOLIC BLOOD PRESSURE: 120 MMHG

## 2021-05-17 DIAGNOSIS — I47.1 SVT (SUPRAVENTRICULAR TACHYCARDIA) (HCC): Primary | ICD-10-CM

## 2021-05-17 PROCEDURE — 99214 OFFICE O/P EST MOD 30 MIN: CPT | Performed by: INTERNAL MEDICINE

## 2021-05-17 NOTE — PROGRESS NOTES
Conner Bermeo MD        OFFICE  FOLLOWUP NOTE    Chief complaints: patient is here for management of CAD, cab LIMA TO LAD,Hyperlipidemia; Hypertension;Sjoegren syndrome, lymphedema, mild AS     Subjective: patient feels better, no chest pain, some shortness of breath, no dizziness, some palpitations    HPI Esther Phillip is a 66 y. o.year old who  has a past medical history of CAD (coronary artery disease), COPD (chronic obstructive pulmonary disease) ([facility] [address] History of , History of cardiac cath, History of cardiac monitoring, History of exercise stress test, Hx of CABG, Hyperlipidemia, Hypertension, and Sjoegren syndrome. and presents for management of CAD, cab LIMA TO LAD,Hyperlipidemia; Hypertension;Sjoegren syndrome, lymphedema, mild AS    which are well controlled      Current Outpatient Medications   Medication Sig Dispense Refill    rosuvastatin (CRESTOR) 40 MG tablet Take 1 tablet by mouth every evening 30 tablet 3    furosemide (LASIX) 20 MG tablet Take 1 tablet by mouth 2 times daily 180 tablet 1    lisinopril (PRINIVIL;ZESTRIL) 10 MG tablet       carvedilol (COREG) 12.5 MG tablet       potassium chloride (KLOR-CON M) 20 MEQ extended release tablet Take 1 tablet by mouth daily 180 tablet 1    aspirin 81 MG EC tablet Take 1 tablet by mouth daily 30 tablet 3    Iron-Vitamins (GERITOL COMPLETE PO) Take 1 tablet by mouth daily      Omega 3 1000 MG CAPS Take 1,000 mg by mouth 2 times daily       No current facility-administered medications for this visit. Allergies: Bactrim [sulfamethoxazole-trimethoprim] and Sulfa antibiotics  Past Medical History:   Diagnosis Date    CAD (coronary artery disease)     COPD (chronic obstructive pulmonary disease) ([facility] 2018    Begings of COPD as of today.     History of      History of cardiac cath 2019    severe LAD, multiple spot stenosis, mid LAD long segment    History of cardiac monitoring 2018    Sinus Rhythm with PSVT    History of exercise stress test 2019    treadmill, due to Dyspnea, Fatigue.  Hx of CABG 02/21/2019    X 2    Hyperlipidemia     Hypertension     Sjoegren syndrome      Past Surgical History:   Procedure Laterality Date    APPENDECTOMY      CORONARY ARTERY BYPASS GRAFT  2019    CABG x2 LIMA to LAD & LIMA to DIAG    CORONARY ARTERY BYPASS GRAFT N/A 2019    CABG CORONARY ARTERY BYPASS, endoscopic saphenous vein harvesting of the left leg, intraoperative KEEGAN performed by Kirill Reinoso MD at [address] [facility]       Family History   Problem Relation Age of Onset    Alzheimer's Disease Mother     Diabetes Father     Heart Attack Father     Heart Attack Sister     Diabetes Sister      Social History     Tobacco Use    Smoking status: Former Smoker     Types: Cigarettes     Quit date:      Years since quittin.3    Smokeless tobacco: Never Used   Substance Use Topics    Alcohol use: No      [unfilled]  Review of Systems:   · Constitutional: No Fever or Weight Loss   · Eyes: No Decreased Vision  · ENT: No Headaches, Hearing Loss or Vertigo  · Cardiovascular: No chest pain, dyspnea on exertion, palpitations or loss of consciousness  · Respiratory: No cough or wheezing    · Gastrointestinal: No abdominal pain, appetite loss, blood in stools, constipation, diarrhea or heartburn  · Genitourinary: No dysuria, trouble voiding, or hematuria  · Musculoskeletal:  No gait disturbance, weakness or joint complaints  · Integumentary: No rash or pruritis  · Neurological: No TIA or stroke symptoms  · Psychiatric: No anxiety or depression  · Endocrine: No malaise, fatigue or temperature intolerance  · Hematologic/Lymphatic: No bleeding problems, blood clots or swollen lymph nodes  · Allergic/Immunologic: No nasal congestion or hives  All systems negative except as marked.    Objective:  /68   Pulse 54   Ht 5' 1\" (1.549 m)   BMI 27.78 kg/m²   Wt Readings from Last 3 Encounters:   02/22/21 147 lb (66.7 kg)   08/17/20 151 lb 9.6 oz (68.8 kg)   03/05/20 157 lb 3.2 oz (71.3 kg)     Body mass index is 27.78 kg/m². GENERAL - Alert, oriented, pleasant, in no apparent distress,normal grooming  HEENT  pupils are intact, cornea intact, conjunctive normal color, ears are normal in exam,  Neck - Supple. No jugular venous distention noted. No carotid bruits, no apical -carotid delay  Respiratory:  Normal breath sounds, No respiratory distress, No wheezing, No chest tenderness. ,no use of accessory muscles, diaphragm movement is normal  Cardiovascular: (PMI) apex non displaced,no lifts no thrills, no s3,no s4, Normal heart rate, Normal rhythm, No murmurs, No rubs, No gallops. Carotid arteries pulse and amplitude are normal no bruit, no abdominal bruit noted ( normal abdominal aorta ausculation),   Extremities - No cyanosis, clubbing, or significant edema, no varicose veins    Abdomen  No masses, tenderness, or organomegaly, no hepato-splenomegally, no bruits  Musculoskeletal  No significant edema, no kyphosis or scoliosis, no deformity in any extremity noted, muscle strength and tone are normal  Skin: no ulcer,no scar,no stasis dermatitis   Neurologic  alert oriented times 3,Cranial nerves II through XII are grossly intact. There were no gross focal neurologic abnormalities. Psychiatric: normal mood and affect    No results found for: CKTOTAL, CKMB, CKMBINDEX, TROPONINI  BNP:  No results found for: BNP  PT/INR:  No results found for: PTINR  Lab Results   Component Value Date    LABA1C 5.7 01/07/2020     Lab Results   Component Value Date    CHOL 164 01/07/2020    TRIG 88 01/07/2020    HDL 74 (A) 01/07/2020    LDLCALC 72 01/07/2020    LDLDIRECT 109 (H) 03/13/2015     Lab Results   Component Value Date    ALT 21 01/07/2020    AST 41 01/07/2020     TSH:  No results found for: TSH    Impression:  Kaiden Rosario is a 66 y. o.year old who  has a past medical history of CAD

## 2021-05-17 NOTE — PATIENT INSTRUCTIONS
Please be informed that if you contact our office outside of normal business hours the physician on call cannot help with any scheduling or rescheduling issues, procedure instruction questions or any type of medication issue. We advise you for any urgent/emergency that you go to the nearest emergency room! PLEASE CALL OUR OFFICE DURING NORMAL BUSINESS HOURS    Monday - Friday   8 am to 5 pm    [place]: [address]: [phone]    [place]:  [phone]  **It is YOUR responsibilty to bring medication bottles and/or updated medication list to [address] [place] [address].  This will allow us to better serve you and all your healthcare needs**

## 2021-05-17 NOTE — LETTER
Valerie Gallegos [address]  1945  [id number]    Have you had any Chest Pain that is not new? - No       DO EKG IF: Patient has a Heart Rate above 100 or below 40     CAD (Coronary Artery Disease) patient should have one on file every 6 months        Have you had any Shortness of Breath - Yes  If Yes - When on exertion    Have you had any dizziness - No       Sitting wait 5 minutes do supine (laying down) wait 5 minutes then do standing - log each in \"vitals\" area in Epic   Be sure to ask what symptoms they are having if they get dizzy while completing ortho stats such as: room spinning, nausea, etc.    Have you had any palpitations that are not new?  - No      Is the patient on any of the following medications -     Do you have any edema - swelling in No      Do you have a surgery or procedure scheduled in the near future - No per pt

## 2021-05-19 ENCOUNTER — TELEPHONE (OUTPATIENT)
Dept: CARDIOLOGY CLINIC | Age: 76
End: 2021-05-19

## 2021-05-19 NOTE — TELEPHONE ENCOUNTER
Patient called to see when she can get in   With Dr Terra German for a consult per Dr Marcelino Wang

## 2021-05-28 ENCOUNTER — INITIAL CONSULT (OUTPATIENT)
Dept: CARDIOLOGY CLINIC | Age: 76
End: 2021-05-28
Payer: COMMERCIAL

## 2021-05-28 VITALS
OXYGEN SATURATION: 98 % | DIASTOLIC BLOOD PRESSURE: 70 MMHG | HEART RATE: 60 BPM | HEIGHT: 61 IN | RESPIRATION RATE: 15 BRPM | BODY MASS INDEX: 28.13 KG/M2 | WEIGHT: 149 LBS | SYSTOLIC BLOOD PRESSURE: 118 MMHG | TEMPERATURE: 98.5 F

## 2021-05-28 DIAGNOSIS — R00.2 PALPITATIONS: ICD-10-CM

## 2021-05-28 DIAGNOSIS — I47.1 SVT (SUPRAVENTRICULAR TACHYCARDIA) (HCC): Primary | ICD-10-CM

## 2021-05-28 PROCEDURE — 99204 OFFICE O/P NEW MOD 45 MIN: CPT | Performed by: INTERNAL MEDICINE

## 2021-05-28 PROCEDURE — 93000 ELECTROCARDIOGRAM COMPLETE: CPT | Performed by: INTERNAL MEDICINE

## 2021-05-28 NOTE — LETTER
[facility]     Dr. Jaun Pablo Daniel     PROCEDURE: Electrophysiology Study/Supraventricular Tachycardia Ablation      Date of Procedure: 21  Time: Rodo Flores 37 Time: 1030    Patient Name: Zachary Cruz  : 1945  MRN# [id number]    HOSPITAL: [facility])    Call to Pre-[place] at [phone]  2 days before your procedure    x Please have blood work and chest-x-ray done 21 at [facility], [address] [place] [address] or [facility].    X Please have COVID-19 Test done on 21 at the [place] [address]. You will need to Quarantine yourself until procedure. x Please do not have anything by mouth after midnight prior to or 8 hours  before the procedure.    x You may take your medications with a sip of water in the morning before your procedure or take them with you unless listed below. X  Hold Carvedilol (Coreg) 2 days prior to the procedure. IMPORTANT NOTICE TO PATIENTS: Prior Authorization  We will contact your insurance for prior authorization for the CPT code related to the procedure it is the patient's responsibility to contact their insurance to ensure they are following their medical plans provisions or requirements! Patient Signature:  _______________________      Staff: Munir Mclean MA     Staff Given Instructions:___________________________              [place] [facility]     Dr. Hudson Plume:  Electrophysiology Study/Supraventricular Tachycardia Ablation     Date of Procedure: 21 Time: 5663 Arrival Time: 7534    Patient Name: Zachary Cruz  : 1945  MRN# [id number]    Day of Procedure Cath Lab Holding area Pre op Orders:    ? IV peripheral saline lock x 2 sites (at least one inpreferred left arm). ? Type & Screen STAT on arrival.  ? If taking Coumadin, PT INR STAT on arrival day of procedure. ?  Female patients <=48years of age >> Please do urine HCG test.  ? Diabetic patients >> Accu check Blood sugar check. ? Document home medications in EPIC and include date and time of last dose.  ? NPO  ? Notify Dr. Ricki Kinney of abnormal lab results. ? Chest Prep> Clip hair anterior chest and posterior back. ? Groin Prep> Clip hair bilateral groins. Physician Signature:_____________________Date:__________Time:________                                                 [facility]) Informed Consent for Anesthesia/Sedation, Surgery, Invasive Procedures, and other High-risk Interventions and Medication use     *This consent is applicable for 30 days following patient signature*    Procedure(s)   IKike authorize, Dr. Librado Mead   and the associate(s) or assistant(s) of his/her choice, to perform the following procedure(s):Electrophysiology Study/Supraventricular Tachycardia Ablation    I know that unexpected conditions may require additional or different procedures than those above. I authorize the above named practitioner(s) perform these as necessary and desirable. This is based on the practitioners professional judgment. The above named practitioner has discussed the above procedure(s) with me, including:   Potential benefits, including likelihood of success of the procedure(s) goals   Risks   Side effects, risk of death, and risk of infection   Any potential problems that might occur during recuperation or healing post-procedure   Reasonable alternatives   Risks of NOT performing the procedure(s)    I acknowledge that no warranty or guarantee has been made to the results the procedure(s). I consent to the above named practitioner(s) providing additional services to me as deemed reasonable and necessary, including but not limited to:     Use of medications for anesthesia or sedation.  All anesthesia and sedation carry risks.   My practitioner has discussed my anticipated anesthesia and/or sedation and the risks of using, risk of not using, benefits, side effects, and alternatives.  Use of pathology  - I authorize [facility] to dispose of tissues, specimens or organs when pathology is complete.  Use of radiology  - A contrast agent may be required for radiology procedures. My practitioner has advised me of the risks of using, risks of not using, benefits, side effects, and alternatives.  Observers or use of photography, video/audio recording, or televising of the procedure(s). This is for medical, scientific, or educational purposes. This includes appropriate portions of my body. My identity will not be revealed.  I consent to release of my social security number and other identifying information to [employer] (FDA), and the supplier/, if I receive tissue, a device, or implant. This is to track the tissue, device, or implant for defect, recall, infection, etc.      Use of blood and/or blood products, if needed, through my hospital stay. My practitioner has advised me of the risks of using, risks of not using, benefits, side effects, and alternatives. ___ I do NOT want Blood or Blood products given. (Complete separate  refusal form)    Code Status (bessy one):  ___ I do NOT HAVE a DNR order. I am a Full-code.   I will receive CPR, intubation,  chest compressions, medications, and/or other life saving measures if I have a  cardiac or respiratory arrest.    ___ I have a Do Not Resuscitate (DNR)order.   (bessy one below)  ___  I rescind my DNR for surgery and immediate post-operative period through Phase 2 recovery. This means, for that time period, I will be a Full-code and receive CPR, intubation, chest compressions, medications, and/or other life saving measures, if I have a cardiac or respiratory arrest.    ___ I WANT to keep my DNR in effect during my procedure(s) and immediate post-operative recovery period through Phase 2 recovery.   (Complete separate refusal form) This form has been fully explained to me. I understand its contents. Patients Signature: ___________________________Date: ________  Time: ________    If patient unable to sign, has engaged the [address] [place] [address] Editorially, is a minor, or has a court-appointed Guardian:  [facility] Representative Name (Print):  ____________________________________      Relationship ([place] one):    Guardian   Parent    Spouse    HCPOA   Child   Sibling  Next-of-Kin Friend    Patients Representative Signature: _______________________________________              Date: ______________  Time: __________    An  was used.  name/ID: _________________________________      [facility] ([facility]) Witness________________________  Date: ________   Time: _________    Physician/Practitioner _______________________  Date: ________   Time: _________           Revision 2017      [facility]     Dr. Rivas Lynn     PATIENT NAME:    Alicja Davison                          :1945    PROCEDURE:  Electrophysiology Study/Supraventricular Tachycardia Ablation       DATE OF PROCEDURE: 21    DIAGNOSIS:   I47.1 , Z01.810, Z79.0             X MAG    X PHOS       X BMP           X CBC     X    PT      X   PTT                X     Chest x-Ray PA & Lateral View        ? PLEASE CALL ABNORMAL RESULTS TO THE REQUESTING PHYSICIAN? ATTENTION PATIENTS:  You do not have to fast for the lab work.           You must go to the [facility], [address] [place] [address] Way or [facility]         Physician Signature:____________________Date:_________Time:_________                                      [place] ([facility]     Dr. Rivas Lynn      PROCEDURE: Electrophysiology Study/Supraventricular Tachycardia Ablation    Patient Name: Alicja Davison  : 1945  MRN# [id number]    Home Phone Number: [phone]   Weight:    Wt Readings from Last 3 Encounters:   21 149 lb (67.6 kg)   21 147 lb (66.7 kg)   08/17/20 151 lb 9.6 oz (68.8 kg)        Insurance: Payor: Feliciano Jensen / Plan: Feliciano Jensen / Product Type: *No Product type* /     Date of Procedure: 6/21/21 Time: 3438 Arrival Time: 1030    Diagnosis:  I47.1 (Supraventricular Tachycardia) Allergies:    Allergies   Allergen Reactions    Bactrim [Sulfamethoxazole-Trimethoprim]     Sulfa Antibiotics Rash        1) Call [facility] scheduling [phone]) or [address] [facility]     PHONE OR   INSTANT MESSAGE  2) PREAUTHORIZATION NUMBER:    Spoke to:      From date:     expiration date:        Farhadzachary Summers [place]

## 2021-05-28 NOTE — PROGRESS NOTES
Electrophysiology Consult Note      Reason for consultation:  SVT    Chief complaint : Palpitations    Referring physician: Gato Marshall      Primary care physician: Ze Rubalcava MD      History of Present Illness:     Patient is a 68-year-old female with history of coronary artery disease s/p CABG, COPD referred by Dr. Gigi Robles for SVT management. Patient reports having palpitations and had happen even yesterday. Patient reports she has this episodes of rapid heartbeat without or with activity in the last few minutes usually. Patient has shortness of breath during that time. But she reports she has not breathing the same since her open heart procedure    Reports she likes to exercise and offload she has been noticing that she is having more palpitations with exercise and she does not feel good about it so she wants to know what is going on. Pastmedical history:   Past Medical History:   Diagnosis Date    CAD (coronary artery disease)     COPD (chronic obstructive pulmonary disease) ([facility] [address] 2018    Begings of COPD as of today.  History of      History of cardiac cath 2019    severe LAD, multiple spot stenosis, mid LAD long segment    History of cardiac monitoring 2018    Sinus Rhythm with PSVT    History of exercise stress test 2019    treadmill, due to Dyspnea, Fatigue.     Hx of CABG 02/21/2019    X 2    Hyperlipidemia     Hypertension     Sjoegren syndrome        Surgical history :   Past Surgical History:   Procedure Laterality Date    APPENDECTOMY      CORONARY ARTERY BYPASS GRAFT  2019    CABG x2 LIMA to LAD & LIMA to DIAG    CORONARY ARTERY BYPASS GRAFT N/A 2019    CABG CORONARY ARTERY BYPASS, endoscopic saphenous vein harvesting of the left leg, intraoperative KEEGAN performed by Saloni Simms MD at [address] [place] [address]         Family history:   Family History   Problem Relation (H) 2015     PT/INR:   Lab Results   Component Value Date    INR 1.43 2019        BMP:    Lab Results   Component Value Date     2020    K 4.1 2020     2020    CO2 22 2020    BUN 24 2020     CMP:   Lab Results   Component Value Date    AST 41 2020    PROT 7.2 2017    BILITOT 0.4 2020    ALKPHOS 69 2020     TSH:  No results found for: TSH    EKGINTERPRETATION - EKG Interpretation:  Sinus rhythm, LBBB      IMPRESSION / RECOMMENDATIONS:     1. SVT  2. Cad sp CABG  3. HTN on lisinopril, coreg  4. HLD  On crestor      Patient does having multiple episodes of SVTs. Patient reports that she usually exercises very well and she is having more episodes recently with exercise. All her life she has been pushing herself and has been very healthy. She has a family history where her father  suddenly with 76 years and her sister  at 76 years and she is 75yrs alena repoorts and she is looking for little more comfortable life for how much of her days she has so she wants help in improving her symptoms    With the patient in detail about pathophysiology of SVTs and different SVTs  Discussed about EP study in detail for diagnosis of SVT and treat if we can at the same time. The risks including, but not limited to, vascular injury, bleeding, infection, radiation exposure, injury to cardiac and surrounding structures (including esophageal and pulmonary vein injury), injury to the normal conduction system of the heart (possibly requiring a pacemaker), stroke, myocardial infarction and death were all discussed. The patient considered the risks, benefits and alternatives; decided to proceed with the procedure. Scheduled for EP study and possible ablation      Thanks again for allowing me to participate in care of this patient. Please call me if you have any questions. With best regards.       Librado Mead MD, 2021 11:03 AM Please note this report has been partially produced using speech recognition software and may contain errors related to that system including errors in grammar, punctuation, and spelling, as well as words and phrases that may be inappropriate. If there are any questions or concerns please feel free to contact the dictating provider for clarification.

## 2021-06-16 ENCOUNTER — HOSPITAL ENCOUNTER (OUTPATIENT)
Dept: GENERAL RADIOLOGY | Age: 76
Discharge: HOME | End: 2021-06-16
Payer: COMMERCIAL

## 2021-06-16 ENCOUNTER — HOSPITAL ENCOUNTER (OUTPATIENT)
Age: 76
Discharge: HOME | End: 2021-06-16
Payer: COMMERCIAL

## 2021-06-16 ENCOUNTER — HOSPITAL ENCOUNTER (OUTPATIENT)
Age: 76
Setting detail: SPECIMEN
Discharge: HOME | End: 2021-06-16
Payer: COMMERCIAL

## 2021-06-16 ENCOUNTER — NURSE ONLY (OUTPATIENT)
Dept: CARDIOLOGY CLINIC | Age: 76
End: 2021-06-16
Payer: COMMERCIAL

## 2021-06-16 VITALS — SYSTOLIC BLOOD PRESSURE: 120 MMHG | DIASTOLIC BLOOD PRESSURE: 70 MMHG

## 2021-06-16 DIAGNOSIS — Z01.818 PRE-PROCEDURAL EXAMINATION: ICD-10-CM

## 2021-06-16 DIAGNOSIS — I47.29 NSVT (NONSUSTAINED VENTRICULAR TACHYCARDIA): ICD-10-CM

## 2021-06-16 LAB
ANION GAP SERPL CALCULATED.3IONS-SCNC: 11 MMOL/L (ref 4–16)
APTT: 24.6 SECONDS (ref 25.1–37.1)
BASOPHILS ABSOLUTE: 0.1 K/CU MM
BASOPHILS RELATIVE PERCENT: 0.8 % (ref 0–1)
BUN BLDV-MCNC: 21 MG/DL (ref 6–23)
CALCIUM SERPL-MCNC: 9.6 MG/DL (ref 8.3–10.6)
CHLORIDE BLD-SCNC: 99 MMOL/L (ref 99–110)
CO2: 26 MMOL/L (ref 21–32)
CREAT SERPL-MCNC: 0.7 MG/DL (ref 0.6–1.1)
DIFFERENTIAL TYPE: ABNORMAL
EOSINOPHILS ABSOLUTE: 0.2 K/CU MM
EOSINOPHILS RELATIVE PERCENT: 2.8 % (ref 0–3)
GFR AFRICAN AMERICAN: >60 ML/MIN/1.73M2
GFR NON-AFRICAN AMERICAN: >60 ML/MIN/1.73M2
GLUCOSE BLD-MCNC: 89 MG/DL (ref 70–99)
HCT VFR BLD CALC: 41.5 % (ref 37–47)
HEMOGLOBIN: 12.9 GM/DL (ref 12.5–16)
IMMATURE NEUTROPHIL %: 0.3 % (ref 0–0.43)
INR BLD: 0.97 INDEX
LYMPHOCYTES ABSOLUTE: 1.3 K/CU MM
LYMPHOCYTES RELATIVE PERCENT: 20.6 % (ref 24–44)
MAGNESIUM: 2.4 MG/DL (ref 1.8–2.4)
MCH RBC QN AUTO: 29.2 PG (ref 27–31)
MCHC RBC AUTO-ENTMCNC: 31.1 % (ref 32–36)
MCV RBC AUTO: 93.9 FL (ref 78–100)
MONOCYTES ABSOLUTE: 0.6 K/CU MM
MONOCYTES RELATIVE PERCENT: 8.7 % (ref 0–4)
NUCLEATED RBC %: 0 %
PDW BLD-RTO: 13.8 % (ref 11.7–14.9)
PHOSPHORUS: 4 MG/DL (ref 2.5–4.9)
PLATELET # BLD: 147 K/CU MM (ref 140–440)
PMV BLD AUTO: 12.7 FL (ref 7.5–11.1)
POTASSIUM SERPL-SCNC: 4.7 MMOL/L (ref 3.5–5.1)
PROTHROMBIN TIME: 11.7 SECONDS (ref 11.7–14.5)
RBC # BLD: 4.42 M/CU MM (ref 4.2–5.4)
SEGMENTED NEUTROPHILS ABSOLUTE COUNT: 4.3 K/CU MM
SEGMENTED NEUTROPHILS RELATIVE PERCENT: 66.8 % (ref 36–66)
SODIUM BLD-SCNC: 136 MMOL/L (ref 135–145)
TOTAL IMMATURE NEUTOROPHIL: 0.02 K/CU MM
TOTAL NUCLEATED RBC: 0 K/CU MM
WBC # BLD: 6.5 K/CU MM (ref 4–10.5)

## 2021-06-16 PROCEDURE — 99211 OFF/OP EST MAY X REQ PHY/QHP: CPT | Performed by: INTERNAL MEDICINE

## 2021-06-16 PROCEDURE — 84100 ASSAY OF PHOSPHORUS: CPT

## 2021-06-16 PROCEDURE — U0005 INFEC AGEN DETEC AMPLI PROBE: HCPCS

## 2021-06-16 PROCEDURE — 36415 COLL VENOUS BLD VENIPUNCTURE: CPT

## 2021-06-16 PROCEDURE — 80048 BASIC METABOLIC PNL TOTAL CA: CPT

## 2021-06-16 PROCEDURE — U0003 INFECTIOUS AGENT DETECTION BY NUCLEIC ACID (DNA OR RNA); SEVERE ACUTE RESPIRATORY SYNDROME CORONAVIRUS 2 (SARS-COV-2) (CORONAVIRUS DISEASE [COVID-19]), AMPLIFIED PROBE TECHNIQUE, MAKING USE OF HIGH THROUGHPUT TECHNOLOGIES AS DESCRIBED BY CMS-2020-01-R: HCPCS

## 2021-06-16 PROCEDURE — 85610 PROTHROMBIN TIME: CPT

## 2021-06-16 PROCEDURE — 85025 COMPLETE CBC W/AUTO DIFF WBC: CPT

## 2021-06-16 PROCEDURE — 71046 X-RAY EXAM CHEST 2 VIEWS: CPT

## 2021-06-16 PROCEDURE — 83735 ASSAY OF MAGNESIUM: CPT

## 2021-06-16 PROCEDURE — 85730 THROMBOPLASTIN TIME PARTIAL: CPT

## 2021-06-16 NOTE — PROGRESS NOTES
Patient informed of instructions and guidance for performing test.  Patient was wearing a mask and provided with gloves and tissues. Patient performed COVID nasal swab for 10 seconds in each nostril. This RN present in the room, 6 feet away. Patient dropped swab in  labeled collection tube. Collection tube and lab order placed in plastic bag. Bag placed in biohazard bag and placed in fridge.  called for . Patient here in office and educated on EPS/SVT ablation, schedule for 6/21/21 @ 1230, with arrival @  , @ [facility]; risk explained; and consents signed. Also copy of orders given for labs and CXR due 6/16/21 at [address]. Instruction given to patient to :  NPO after midnight the night before procedure; call hospital at [phone] to pre-register. May take rest of morning meds of procedure except Coreg. Hold Coreg 2 days prior to procedure. Patient voiced understanding. Copies of consent & info scanned in chart.

## 2021-06-18 LAB
SARS-COV-2: NOT DETECTED
SOURCE: NORMAL

## 2021-06-18 ASSESSMENT — ENCOUNTER SYMPTOMS
CONSTIPATION: 0
VOMITING: 0
NAUSEA: 0
ABDOMINAL PAIN: 0
CHEST TIGHTNESS: 0
SHORTNESS OF BREATH: 0
COLOR CHANGE: 0
COUGH: 0
PHOTOPHOBIA: 0
BACK PAIN: 0
WHEEZING: 0
BLOOD IN STOOL: 0
EYE PAIN: 0
DIARRHEA: 0

## 2021-06-21 ENCOUNTER — HOSPITAL ENCOUNTER (OUTPATIENT)
Dept: CARDIAC CATH/INVASIVE PROCEDURES | Age: 76
Discharge: HOME | End: 2021-06-22
Attending: INTERNAL MEDICINE | Admitting: INTERNAL MEDICINE
Payer: COMMERCIAL

## 2021-06-21 PROBLEM — Z86.79 S/P CATHETER ABLATION OF SLOW PATHWAY: Status: ACTIVE | Noted: 2021-06-21

## 2021-06-21 PROBLEM — Z98.890 S/P CATHETER ABLATION OF SLOW PATHWAY: Status: ACTIVE | Noted: 2021-06-21

## 2021-06-21 PROCEDURE — 93621 COMP EP EVL L PAC&REC C SINS: CPT

## 2021-06-21 PROCEDURE — 86901 BLOOD TYPING SEROLOGIC RH(D): CPT

## 2021-06-21 PROCEDURE — C1894 INTRO/SHEATH, NON-LASER: HCPCS

## 2021-06-21 PROCEDURE — 2580000003 HC RX 258

## 2021-06-21 PROCEDURE — 6360000002 HC RX W HCPCS

## 2021-06-21 PROCEDURE — 86850 RBC ANTIBODY SCREEN: CPT

## 2021-06-21 PROCEDURE — 6370000000 HC RX 637 (ALT 250 FOR IP): Performed by: NURSE PRACTITIONER

## 2021-06-21 PROCEDURE — 2709999900 HC NON-CHARGEABLE SUPPLY

## 2021-06-21 PROCEDURE — 93613 INTRACARDIAC EPHYS 3D MAPG: CPT | Performed by: INTERNAL MEDICINE

## 2021-06-21 PROCEDURE — C1730 CATH, EP, 19 OR FEW ELECT: HCPCS

## 2021-06-21 PROCEDURE — 2580000003 HC RX 258: Performed by: NURSE PRACTITIONER

## 2021-06-21 PROCEDURE — 93653 COMPRE EP EVAL TX SVT: CPT

## 2021-06-21 PROCEDURE — C1732 CATH, EP, DIAG/ABL, 3D/VECT: HCPCS

## 2021-06-21 PROCEDURE — C1893 INTRO/SHEATH, FIXED,NON-PEEL: HCPCS

## 2021-06-21 PROCEDURE — 86900 BLOOD TYPING SEROLOGIC ABO: CPT

## 2021-06-21 PROCEDURE — 93653 COMPRE EP EVAL TX SVT: CPT | Performed by: INTERNAL MEDICINE

## 2021-06-21 PROCEDURE — C1733 CATH, EP, OTHR THAN COOL-TIP: HCPCS

## 2021-06-21 PROCEDURE — 93613 INTRACARDIAC EPHYS 3D MAPG: CPT

## 2021-06-21 PROCEDURE — 2500000003 HC RX 250 WO HCPCS

## 2021-06-21 PROCEDURE — 93621 COMP EP EVL L PAC&REC C SINS: CPT | Performed by: INTERNAL MEDICINE

## 2021-06-21 RX ORDER — ASPIRIN 81 MG/1
81 TABLET ORAL DAILY
Status: DISCONTINUED | OUTPATIENT
Start: 2021-06-21 | End: 2021-06-22 | Stop reason: HOSPADM

## 2021-06-21 RX ORDER — ACETAMINOPHEN 325 MG/1
650 TABLET ORAL EVERY 4 HOURS PRN
Status: DISCONTINUED | OUTPATIENT
Start: 2021-06-21 | End: 2021-06-22 | Stop reason: HOSPADM

## 2021-06-21 RX ORDER — CARVEDILOL 12.5 MG/1
12.5 TABLET ORAL 2 TIMES DAILY WITH MEALS
Status: DISCONTINUED | OUTPATIENT
Start: 2021-06-21 | End: 2021-06-22 | Stop reason: HOSPADM

## 2021-06-21 RX ORDER — SODIUM CHLORIDE 0.9 % (FLUSH) 0.9 %
5-40 SYRINGE (ML) INJECTION PRN
Status: DISCONTINUED | OUTPATIENT
Start: 2021-06-21 | End: 2021-06-22 | Stop reason: HOSPADM

## 2021-06-21 RX ORDER — SODIUM CHLORIDE 0.9 % (FLUSH) 0.9 %
5-40 SYRINGE (ML) INJECTION EVERY 12 HOURS SCHEDULED
Status: DISCONTINUED | OUTPATIENT
Start: 2021-06-21 | End: 2021-06-22 | Stop reason: HOSPADM

## 2021-06-21 RX ORDER — ROSUVASTATIN CALCIUM 20 MG/1
40 TABLET, COATED ORAL EVERY EVENING
Status: DISCONTINUED | OUTPATIENT
Start: 2021-06-21 | End: 2021-06-22 | Stop reason: HOSPADM

## 2021-06-21 RX ORDER — SODIUM CHLORIDE 9 MG/ML
25 INJECTION, SOLUTION INTRAVENOUS PRN
Status: DISCONTINUED | OUTPATIENT
Start: 2021-06-21 | End: 2021-06-22 | Stop reason: HOSPADM

## 2021-06-21 RX ORDER — OMEGA-3-ACID ETHYL ESTERS 1 G/1
1000 CAPSULE, LIQUID FILLED ORAL 2 TIMES DAILY
Status: DISCONTINUED | OUTPATIENT
Start: 2021-06-21 | End: 2021-06-22 | Stop reason: HOSPADM

## 2021-06-21 RX ORDER — LISINOPRIL 20 MG/1
20 TABLET ORAL DAILY
Status: DISCONTINUED | OUTPATIENT
Start: 2021-06-21 | End: 2021-06-22 | Stop reason: HOSPADM

## 2021-06-21 RX ORDER — POTASSIUM CHLORIDE 20 MEQ/1
20 TABLET, EXTENDED RELEASE ORAL DAILY
Status: DISCONTINUED | OUTPATIENT
Start: 2021-06-21 | End: 2021-06-22 | Stop reason: HOSPADM

## 2021-06-21 RX ORDER — FUROSEMIDE 20 MG/1
20 TABLET ORAL 2 TIMES DAILY
Status: DISCONTINUED | OUTPATIENT
Start: 2021-06-21 | End: 2021-06-22 | Stop reason: HOSPADM

## 2021-06-21 RX ADMIN — FUROSEMIDE 20 MG: 20 TABLET ORAL at 20:16

## 2021-06-21 RX ADMIN — ROSUVASTATIN CALCIUM 40 MG: 20 TABLET, COATED ORAL at 20:17

## 2021-06-21 RX ADMIN — SODIUM CHLORIDE, PRESERVATIVE FREE 10 ML: 5 INJECTION INTRAVENOUS at 20:17

## 2021-06-21 RX ADMIN — OMEGA-3-ACID ETHYL ESTERS 1000 MG: 900 CAPSULE, LIQUID FILLED ORAL at 20:16

## 2021-06-21 ASSESSMENT — ENCOUNTER SYMPTOMS
CONSTIPATION: 0
BLOOD IN STOOL: 0
CHEST TIGHTNESS: 0
VOMITING: 0
EYE PAIN: 0
ABDOMINAL PAIN: 0
BACK PAIN: 0
NAUSEA: 0
PHOTOPHOBIA: 0
COUGH: 0
WHEEZING: 0
DIARRHEA: 0
COLOR CHANGE: 0
SHORTNESS OF BREATH: 0

## 2021-06-21 NOTE — H&P
Electrophysiology H&p Note      Reason for consultation:  SVT    Chief complaint : Palpitations    Referring physician: Gato Marshall      Primary care physician: Ze Rubalcava MD      History of Present Illness: Today visit (21)    Patient here for EP study and ablation. No change in H&P noted from previous clinic visit. Previous visit:     Patient is a 24-year-old female with history of coronary artery disease s/p CABG, COPD referred by Dr. Gigi Robles for SVT management. Patient reports having palpitations and had happen even yesterday. Patient reports she has this episodes of rapid heartbeat without or with activity in the last few minutes usually. Patient has shortness of breath during that time. But she reports she has not breathing the same since her open heart procedure    Reports she likes to exercise and offload she has been noticing that she is having more palpitations with exercise and she does not feel good about it so she wants to know what is going on. Pastmedical history:   Past Medical History:   Diagnosis Date    CAD (coronary artery disease)     COPD (chronic obstructive pulmonary disease) ([facility] [address] 2018    Begings of COPD as of today.  History of      History of cardiac cath 2019    severe LAD, multiple spot stenosis, mid LAD long segment    History of cardiac monitoring 2018    Sinus Rhythm with PSVT    History of exercise stress test 2019    treadmill, due to Dyspnea, Fatigue.     Hx of CABG 02/21/2019    X 2    Hyperlipidemia     Hypertension     Sjoegren syndrome        Surgical history :   Past Surgical History:   Procedure Laterality Date    APPENDECTOMY      CORONARY ARTERY BYPASS GRAFT  2019    CABG x2 LIMA to LAD & LIMA to DIAG    CORONARY ARTERY BYPASS GRAFT N/A 2019    CABG CORONARY ARTERY BYPASS, endoscopic saphenous vein harvesting of the left leg, intraoperative KEEGAN performed by Ric Metcalf MD at [facility]         Family history:   Family History   Problem Relation Age of Onset    Alzheimer's Disease Mother     Diabetes Father     Heart Attack Father     Heart Attack Sister     Diabetes Sister        Social history :  reports that she quit smoking about 36 years ago. Her smoking use included cigarettes. She has never used smokeless tobacco. She reports that she does not drink alcohol and does not use drugs. Allergies   Allergen Reactions    Bactrim [Sulfamethoxazole-Trimethoprim]     Sulfa Antibiotics Rash       No current facility-administered medications on file prior to encounter. Current Outpatient Medications on File Prior to Encounter   Medication Sig Dispense Refill    rosuvastatin (CRESTOR) 40 MG tablet Take 1 tablet by mouth every evening 30 tablet 3    furosemide (LASIX) 20 MG tablet Take 1 tablet by mouth 2 times daily 180 tablet 1    lisinopril (PRINIVIL;ZESTRIL) 10 MG tablet       potassium chloride (KLOR-CON M) 20 MEQ extended release tablet Take 1 tablet by mouth daily 180 tablet 1    aspirin 81 MG EC tablet Take 1 tablet by mouth daily 30 tablet 3    Iron-Vitamins (GERITOL COMPLETE PO) Take 1 tablet by mouth daily      Omega 3 1000 MG CAPS Take 1,000 mg by mouth 2 times daily      carvedilol (COREG) 12.5 MG tablet          Review of Systems:   Review of Systems   Constitutional: Positive for fatigue. Negative for activity change, chills and fever. HENT: Negative for congestion, ear pain and tinnitus. Eyes: Negative for photophobia, pain and visual disturbance. Respiratory: Negative for cough, chest tightness, shortness of breath and wheezing. Cardiovascular: Positive for palpitations. Negative for chest pain and leg swelling. Gastrointestinal: Negative for abdominal pain, blood in stool, constipation, diarrhea, nausea and vomiting.    Endocrine: Negative for cold intolerance and heat intolerance. Genitourinary: Negative for dysuria, flank pain and hematuria. Musculoskeletal: Negative for arthralgias, back pain, myalgias and neck stiffness. Skin: Negative for color change and rash. Allergic/Immunologic: Negative for food allergies. Neurological: Negative for dizziness, light-headedness, numbness and headaches. Hematological: Does not bruise/bleed easily. Psychiatric/Behavioral: Negative for agitation, behavioral problems and confusion. Examination:      Vitals:    06/21/21 1056   BP: (!) 170/86   Pulse: 89   Resp: 16   Temp: 97.4 °F (36.3 °C)   TempSrc: Temporal   SpO2: 98%   Weight: 146 lb (66.2 kg)   Height: 5' 1\" (1.549 m)        Body mass index is 27.59 kg/m². Physical Exam  Constitutional:       Appearance: She is well-developed. HENT:      Head: Normocephalic and atraumatic. Nose: No congestion. Mouth/Throat:      Mouth: Mucous membranes are moist.   Eyes:      Conjunctiva/sclera: Conjunctivae normal.   Neck:      Thyroid: No thyromegaly. Vascular: No JVD. Cardiovascular:      Rate and Rhythm: Normal rate and regular rhythm. Heart sounds: Normal heart sounds. No murmur heard. No friction rub. Pulmonary:      Effort: Pulmonary effort is normal. No respiratory distress. Breath sounds: Normal breath sounds. No stridor. No wheezing. Abdominal:      General: There is no distension. Palpations: Abdomen is soft. Tenderness: There is no abdominal tenderness. Musculoskeletal:         General: No tenderness. Normal range of motion. Cervical back: Normal range of motion. Skin:     General: Skin is warm and dry. Findings: No erythema. Neurological:      General: No focal deficit present. Mental Status: She is alert and oriented to person, place, and time.    Psychiatric:         Behavior: Behavior normal.               CBC:   Lab Results   Component Value Date    WBC 6.5 06/16/2021    HGB 12.9 06/16/2021 HCT 41.5 2021     2021     Lipids:  Lab Results   Component Value Date    CHOL 164 2020    TRIG 88 2020    HDL 74 (A) 2020    LDLCALC 72 2020    LDLDIRECT 109 (H) 2015     PT/INR:   Lab Results   Component Value Date    INR 0.97 2021        BMP:    Lab Results   Component Value Date     2021    K 4.7 2021    CL 99 2021    CO2 26 2021    BUN 21 2021     CMP:   Lab Results   Component Value Date    AST 41 2020    PROT 7.2 2017    BILITOT 0.4 2020    ALKPHOS 69 2020     TSH:  No results found for: TSH    EKGINTERPRETATION - EKG Interpretation:  Sinus rhythm, LBBB      IMPRESSION / RECOMMENDATIONS:     1. SVT  2. Cad sp CABG  3. HTN on lisinopril, coreg  4. HLD  On crestor      Patient does having multiple episodes of SVTs. Patient reports that she usually exercises very well and she is having more episodes recently with exercise. All her life she has been pushing herself and has been very healthy. She has a family history where her father  suddenly with 76 years and her sister  at 76 years and she is 75yrs alena repoorts and she is looking for little more comfortable life for how much of her days she has so she wants help in improving her symptoms    With the patient in detail about pathophysiology of SVTs and different SVTs  Discussed about EP study in detail for diagnosis of SVT and treat if we can at the same time. The risks including, but not limited to, vascular injury, bleeding, infection, radiation exposure, injury to cardiac and surrounding structures (including esophageal and pulmonary vein injury), injury to the normal conduction system of the heart (possibly requiring a pacemaker), stroke, myocardial infarction and death were all discussed. The patient considered the risks, benefits and alternatives; decided to proceed with the procedure.     Scheduled for EP study and possible ablation      Thanks again for allowing me to participate in care of this patient. Please call me if you have any questions. With best regards. Gabrielle Matos MD, 6/21/2021 12:29 PM     Please note this report has been partially produced using speech recognition software and may contain errors related to that system including errors in grammar, punctuation, and spelling, as well as words and phrases that may be inappropriate. If there are any questions or concerns please feel free to contact the dictating provider for clarification.

## 2021-06-21 NOTE — OP NOTE
240 ms;     450ms / 250 ms  VAERP : 500/less than 240 ms;     Arrhythmia Induction:  A supraventricular tachyarrhythmia was induced with programmed stimulation at 450/370ms with AH jump. Patient actually went into SVT while placing the catheters itself and once on simple programmed stimulation also she went into SVT with a tachycardia cycle length of 410 ms. Patient tachycardia short RP. The induced tachyarrhythmia had a cycle length (TCL) of 410ms. The VA time during the tachycardia was 41 ms. Ventricular entrainment was performed and demonstrated a EMA response with a long return cycle length (> 115ms + the TCL). In addition, the difference in the stim-atrial EGM time during ventricular entrainment and VA time during SVT was > 85ms. Variation in the tachycardia cycle length demonstrated dependence on HH variation confirming AV gaviota participation. 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 Also please note that patient was on S3 and S4 with aggressive stimulation went into short 6-10 beats of what appeared to be like PAF and spontaneously terminated  twice during the stimulation    3D Mapping:    [employer] CARTO 3 system was used for [employer]. Was also helpful in confirming the electro-anatomical/activation mapping. Important anatomical landmarks were marked on the map to avoid complications. Also Ablation spots were marked to able to be precise and focus on the ablation area and avoid collateral damage. 3D mapping was helpful in decreasing the fluoroscopy time. 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 Electrophysiologic mapping was performed to localize an area on the anterior lip of the coronary sinus ostium where an atrial-ventricular ratio of approximately 1:3 could be obtained. Following identification of this area, a lesion was delivered (50 W, 60 C, 60 seconds) with demonstration of accelerated junctional rhythm with maintenance of antegrade and retrograde conduction. Reinduction:  Following ablation, programmed stimulation was performed  with demonstration of no sustained tachyarrhythmias or double AV gaviota echo beats in contrast to prior to the ablation. There was no slow pathway after the ablation. Patient post ablation had Wenckebach at 460 and was having AV gaviota ERP at 500/460ms. VAERP : 600/less than 260  VERP : 600/260ms    Up to 3 premature atrial and ventricular beats following a drive train, were brought in up to individual ERP and no arrhythmia was inducible. I could not induce short atrial fibrillation post ablation of AVNRT. So we need to probably watch to see if there is any future A. fib episodes on the patient either with an event monitor or loop recorder. Following an appropriate waiting period during which success of the ablation was confirmed, catheters were removed. Sheaths were removed and hemostasis achieved with manual compression    The patient was transported to the holding area in stable condition. Summary:  Comprehensive electrophysiologic study with successful modification of slow pathway of AV node for treatment of AV gaviota reentrant tachycardia.     Recommendations:  Bedrest x 6 hours with legs straight  Monitor on telemetry  EKG upon arrival and in AM

## 2021-06-22 VITALS
TEMPERATURE: 97.9 F | BODY MASS INDEX: 27.89 KG/M2 | SYSTOLIC BLOOD PRESSURE: 121 MMHG | WEIGHT: 147.71 LBS | DIASTOLIC BLOOD PRESSURE: 53 MMHG | HEIGHT: 61 IN | RESPIRATION RATE: 13 BRPM | HEART RATE: 55 BPM | OXYGEN SATURATION: 96 %

## 2021-06-22 LAB
ANION GAP SERPL CALCULATED.3IONS-SCNC: 17 MMOL/L (ref 4–16)
BUN BLDV-MCNC: 15 MG/DL (ref 6–23)
CALCIUM SERPL-MCNC: 9.5 MG/DL (ref 8.3–10.6)
CHLORIDE BLD-SCNC: 102 MMOL/L (ref 99–110)
CO2: 21 MMOL/L (ref 21–32)
CREAT SERPL-MCNC: 0.7 MG/DL (ref 0.6–1.1)
GFR AFRICAN AMERICAN: >60 ML/MIN/1.73M2
GFR NON-AFRICAN AMERICAN: >60 ML/MIN/1.73M2
GLUCOSE BLD-MCNC: 80 MG/DL (ref 70–99)
MAGNESIUM: 2.1 MG/DL (ref 1.8–2.4)
POTASSIUM SERPL-SCNC: 4.3 MMOL/L (ref 3.5–5.1)
SODIUM BLD-SCNC: 140 MMOL/L (ref 135–145)

## 2021-06-22 PROCEDURE — 2580000003 HC RX 258: Performed by: NURSE PRACTITIONER

## 2021-06-22 PROCEDURE — 6370000000 HC RX 637 (ALT 250 FOR IP): Performed by: NURSE PRACTITIONER

## 2021-06-22 PROCEDURE — 86900 BLOOD TYPING SEROLOGIC ABO: CPT

## 2021-06-22 PROCEDURE — 36415 COLL VENOUS BLD VENIPUNCTURE: CPT

## 2021-06-22 PROCEDURE — 94761 N-INVAS EAR/PLS OXIMETRY MLT: CPT

## 2021-06-22 PROCEDURE — 86850 RBC ANTIBODY SCREEN: CPT

## 2021-06-22 PROCEDURE — 80048 BASIC METABOLIC PNL TOTAL CA: CPT

## 2021-06-22 PROCEDURE — 86901 BLOOD TYPING SEROLOGIC RH(D): CPT

## 2021-06-22 PROCEDURE — 83735 ASSAY OF MAGNESIUM: CPT

## 2021-06-22 PROCEDURE — 99217 PR OBSERVATION CARE DISCHARGE MANAGEMENT: CPT | Performed by: NURSE PRACTITIONER

## 2021-06-22 RX ORDER — LISINOPRIL 20 MG/1
20 TABLET ORAL DAILY
Qty: 30 TABLET | Refills: 3 | Status: SHIPPED | OUTPATIENT
Start: 2021-06-22

## 2021-06-22 RX ADMIN — LISINOPRIL 20 MG: 20 TABLET ORAL at 09:44

## 2021-06-22 RX ADMIN — FUROSEMIDE 20 MG: 20 TABLET ORAL at 09:43

## 2021-06-22 RX ADMIN — OMEGA-3-ACID ETHYL ESTERS 1000 MG: 900 CAPSULE, LIQUID FILLED ORAL at 09:43

## 2021-06-22 RX ADMIN — SODIUM CHLORIDE, PRESERVATIVE FREE 10 ML: 5 INJECTION INTRAVENOUS at 09:44

## 2021-06-22 RX ADMIN — CARVEDILOL 12.5 MG: 12.5 TABLET, FILM COATED ORAL at 09:43

## 2021-06-22 RX ADMIN — ASPIRIN 81 MG: 81 TABLET, COATED ORAL at 09:43

## 2021-06-22 RX ADMIN — POTASSIUM CHLORIDE 20 MEQ: 1500 TABLET, EXTENDED RELEASE ORAL at 09:43

## 2021-06-22 NOTE — DISCHARGE SUMMARY
rub or gallop  Extremities: extremities normal, atraumatic, no cyanosis or edema  Pulses: 2+ and symmetric  Skin: Skin color, texture, turgor normal. No rashes or lesions. Bilateral femoral groin sties soft, nontender. No hematoma. Minimal bruising.    Neurologic: Grossly normal    Disposition:   home    Signed:  DENA Long - CNP  6/22/2021, 8:34 AM

## 2021-06-29 ENCOUNTER — OFFICE VISIT (OUTPATIENT)
Dept: CARDIOLOGY CLINIC | Age: 76
End: 2021-06-29
Payer: COMMERCIAL

## 2021-06-29 VITALS
DIASTOLIC BLOOD PRESSURE: 70 MMHG | HEIGHT: 61 IN | HEART RATE: 52 BPM | BODY MASS INDEX: 27.08 KG/M2 | WEIGHT: 143.4 LBS | SYSTOLIC BLOOD PRESSURE: 102 MMHG

## 2021-06-29 DIAGNOSIS — Z98.890 S/P CATHETER ABLATION OF SLOW PATHWAY: Primary | ICD-10-CM

## 2021-06-29 DIAGNOSIS — Z86.79 S/P CATHETER ABLATION OF SLOW PATHWAY: Primary | ICD-10-CM

## 2021-06-29 DIAGNOSIS — I10 ESSENTIAL HYPERTENSION: ICD-10-CM

## 2021-06-29 PROCEDURE — 99214 OFFICE O/P EST MOD 30 MIN: CPT | Performed by: NURSE PRACTITIONER

## 2021-06-29 PROCEDURE — 93000 ELECTROCARDIOGRAM COMPLETE: CPT | Performed by: NURSE PRACTITIONER

## 2021-06-29 ASSESSMENT — ENCOUNTER SYMPTOMS
CHEST TIGHTNESS: 0
BACK PAIN: 0
NAUSEA: 0
BLOOD IN STOOL: 0
COUGH: 0
COLOR CHANGE: 0
EYE PAIN: 0
WHEEZING: 0
ABDOMINAL PAIN: 0
VOMITING: 0
CONSTIPATION: 0
DIARRHEA: 0
PHOTOPHOBIA: 0
SHORTNESS OF BREATH: 0

## 2021-06-29 NOTE — PATIENT INSTRUCTIONS
**It is YOUR responsibilty to bring medication bottles and/or updated medication list to [address] [place] [address]. This will allow us to better serve you and all your healthcare needs**    Please be informed that if you contact our office outside of normal business hours the physician on call cannot help with any scheduling or rescheduling issues, procedure instruction questions or any type of medication issue. We advise you for any urgent/emergency that you go to the nearest emergency room!     PLEASE CALL OUR OFFICE DURING NORMAL BUSINESS HOURS    Monday - Friday   8 am to 5 pm    [place]: [address]: [phone]    [place]:  [phone]

## 2021-06-29 NOTE — PROGRESS NOTES
Systems   Constitutional: Positive for fatigue (Improved). Negative for activity change, chills and fever. HENT: Negative for congestion, ear pain and tinnitus. Eyes: Negative for photophobia, pain and visual disturbance. Respiratory: Negative for cough, chest tightness, shortness of breath and wheezing. Cardiovascular: Negative for chest pain, palpitations and leg swelling. Gastrointestinal: Negative for abdominal pain, blood in stool, constipation, diarrhea, nausea and vomiting. Endocrine: Negative for cold intolerance and heat intolerance. Genitourinary: Negative for dysuria, flank pain and hematuria. Musculoskeletal: Negative for arthralgias, back pain, myalgias and neck stiffness. Skin: Negative for color change and rash. Allergic/Immunologic: Negative for food allergies. Neurological: Negative for dizziness, light-headedness, numbness and headaches. Hematological: Does not bruise/bleed easily. Psychiatric/Behavioral: Negative for agitation, behavioral problems and confusion. Examination:      Vitals:    06/29/21 0956   BP: 102/70   Pulse: 52   Weight: 143 lb 6.4 oz (65 kg)   Height: 5' 1\" (1.549 m)        Body mass index is 27.1 kg/m². Physical Exam  Constitutional:       Appearance: She is well-developed. HENT:      Head: Normocephalic and atraumatic. Nose: No congestion. Mouth/Throat:      Mouth: Mucous membranes are moist.   Eyes:      Conjunctiva/sclera: Conjunctivae normal.   Neck:      Thyroid: No thyromegaly. Vascular: No JVD. Cardiovascular:      Rate and Rhythm: Normal rate and regular rhythm. Heart sounds: Normal heart sounds. No murmur heard. No friction rub. Pulmonary:      Effort: Pulmonary effort is normal. No respiratory distress. Breath sounds: Normal breath sounds. No stridor. No wheezing. Abdominal:      General: There is no distension. Palpations: Abdomen is soft. Tenderness:  There is no abdominal tenderness. Musculoskeletal:         General: No tenderness. Normal range of motion. Cervical back: Normal range of motion. Skin:     General: Skin is warm and dry. Findings: No erythema. Comments: Bilateral femoral groin site soft nontender no hematoma minimal bruising   Neurological:      General: No focal deficit present. Mental Status: She is alert and oriented to person, place, and time. Psychiatric:         Behavior: Behavior normal.               CBC:   Lab Results   Component Value Date    WBC 6.5 06/16/2021    HGB 12.9 06/16/2021    HCT 41.5 06/16/2021     06/16/2021     Lipids:  Lab Results   Component Value Date    CHOL 164 01/07/2020    TRIG 88 01/07/2020    HDL 74 (A) 01/07/2020    LDLCALC 72 01/07/2020    LDLDIRECT 109 (H) 03/13/2015     PT/INR:   Lab Results   Component Value Date    INR 0.97 06/16/2021        BMP:    Lab Results   Component Value Date     06/22/2021    K 4.3 06/22/2021     06/22/2021    CO2 21 06/22/2021    BUN 15 06/22/2021     CMP:   Lab Results   Component Value Date    AST 41 01/07/2020    PROT 7.2 03/16/2017    BILITOT 0.4 01/07/2020    ALKPHOS 69 01/07/2020     TSH:  No results found for: TSH    EKGINTERPRETATION - EKG Interpretation:  Sinus rhythm, LBBB      IMPRESSION / RECOMMENDATIONS:     Status post ablation of AVNRT  Hypertension    Overall patient reports she is feeling well. EKG obtained patient noted to be in sinus rhythm. Bilateral femoral groin sites have healed well  Continue Coreg 12.5 mg twice daily and aspirin 81 mg daily    Vitals:    06/29/21 0956   BP: 102/70   Pulse: 52     Blood pressure is improved. Continue lisinopril 20 mg daily    Patient to follow-up in 1 month          Thanks again for allowing me to participate in care of this patient. Please call me if you have any questions. With best regards.       DENA Perez CNP, 6/29/2021 10:03 AM     Please note this report has been partially produced using speech recognition software and may contain errors related to that system including errors in grammar, punctuation, and spelling, as well as words and phrases that may be inappropriate. If there are any questions or concerns please feel free to contact the dictating provider for clarification.

## 2021-06-29 NOTE — LETTER
Anuj Manuel  1945  [id number]    Have you had any Chest Pain that is not new? - No  If Yes DO EKG - How does it feel - pressure       Have you had any Shortness of Breath - No    Have you had any dizziness - Yes  If Yes DO ORTHOSTATIC BP - when do you feel dizzy upon standing. Pt states this is getting better     Have you had any palpitations that are not new? - Yes  If Yes DO EKG - Do you feel your heart fluttering  How long does it last - .2  seconds     Is the patient on any of the following medications -   If Yes DO EKG - Needs done every 6 months    Do you have any edema - swelling in yes  Not new    When did you have your last labs drawn   Where did you have them done   What doctor ordered     If we do not have these labs you are retrieve these labs for these providers!     Do you have a surgery or procedure scheduled in the near future - No     Ask patient if they want to sign up for [employer] if they are not already signed up     Check to see if we have an E-MAIL on file for the patient     Check medication list thoroughly!!! AND RECONCILE OUTSIDE MEDICATIONS  If dose has changed change the entire order not just the MG  BE SURE TO ASK PATIENT IF THEY NEED MEDICATION REFILLS     At check out add to every patient's \"wrap up\" the following dot phrase AFTERHOURSEDUCATION and ensure we explain this to our patients

## 2021-07-27 ENCOUNTER — OFFICE VISIT (OUTPATIENT)
Dept: CARDIOLOGY CLINIC | Age: 76
End: 2021-07-27
Payer: COMMERCIAL

## 2021-07-27 VITALS
WEIGHT: 147.2 LBS | BODY MASS INDEX: 27.79 KG/M2 | HEIGHT: 61 IN | SYSTOLIC BLOOD PRESSURE: 128 MMHG | HEART RATE: 62 BPM | DIASTOLIC BLOOD PRESSURE: 80 MMHG

## 2021-07-27 DIAGNOSIS — I10 ESSENTIAL HYPERTENSION: ICD-10-CM

## 2021-07-27 DIAGNOSIS — Z86.79 S/P CATHETER ABLATION OF SLOW PATHWAY: Primary | ICD-10-CM

## 2021-07-27 DIAGNOSIS — Z98.890 S/P CATHETER ABLATION OF SLOW PATHWAY: Primary | ICD-10-CM

## 2021-07-27 PROCEDURE — 99214 OFFICE O/P EST MOD 30 MIN: CPT | Performed by: NURSE PRACTITIONER

## 2021-07-27 PROCEDURE — 93000 ELECTROCARDIOGRAM COMPLETE: CPT | Performed by: NURSE PRACTITIONER

## 2021-07-27 RX ORDER — MINOCYCLINE HYDROCHLORIDE 50 MG/1
CAPSULE ORAL
COMMUNITY
Start: 2021-06-22

## 2021-07-27 RX ORDER — FERROUS SULFATE 325(65) MG
TABLET ORAL
COMMUNITY
Start: 2021-04-26

## 2021-07-27 ASSESSMENT — ENCOUNTER SYMPTOMS
SINUS PRESSURE: 0
DIARRHEA: 0
EYE REDNESS: 0
BLOOD IN STOOL: 0
EYE ITCHING: 0
VOMITING: 0
CHEST TIGHTNESS: 0
ABDOMINAL PAIN: 0
COLOR CHANGE: 0
NAUSEA: 0
BACK PAIN: 0
WHEEZING: 0
SHORTNESS OF BREATH: 0
SINUS PAIN: 0
CONSTIPATION: 0
COUGH: 0

## 2021-07-27 NOTE — PROGRESS NOTES
Electrophysiology Follow up Note      Reason for consultation:  SVT    Chief complaint: 1 month follow up on ablation of AVNRT    Referring physician: Jovan Seen      Primary care physician: Sheron Child MD      History of Present Illness: This visit 7/27/2021  Patient is here today for 1 month follow-up post ablation of AVNRT and hypertension. She reports that she continues to feel well since the ablation. She states that she is back to her normal self. She is walking on the treadmill daily for 30 minutes. She states that she does not have chest pain, palpitations, shortness of breath, lightheadedness, dizziness, edema or syncope. She reports that her blood pressure has been well controlled. She is taking her medications as prescribed. She is avoiding alcohol and caffeine. Previous visit 6/29/2021  Patient here for 1 week follow-up status post ablation of AVNRT. Also here for hypertension. Patient reports that she has been feeling well since the ablation. She states that she does have some fatigue but it is gradually improving. She denies chest pain, palpitations, shortness of breath, lightheadedness, dizziness, edema or syncope. She reports that her groin sites have healed well. She reports that her blood pressure has been better controlled since discharge from the hospital.  She is taking her medications as prescribed. Previous visit  Patient is a 39-year-old female with history of coronary artery disease s/p CABG, COPD referred by Dr. Grace Jimenez for SVT management. Patient reports having palpitations and had happen even yesterday. Patient reports she has this episodes of rapid heartbeat without or with activity in the last few minutes usually. Patient has shortness of breath during that time.   But she reports she has not breathing the same since her open heart procedure    Reports she likes to exercise and offload she has been noticing that she is having more palpitations with exercise and she does not feel good about it so she wants to know what is going on. Pastmedical history:   Past Medical History:   Diagnosis Date    CAD (coronary artery disease)     COPD (chronic obstructive pulmonary disease) ([facility] [address] 2018    Begings of COPD as of today.  History of      History of cardiac cath 2019    severe LAD, multiple spot stenosis, mid LAD long segment    History of cardiac monitoring 2018    Sinus Rhythm with PSVT    History of exercise stress test 2019    treadmill, due to Dyspnea, Fatigue.  Hx of CABG 02/21/2019    X 2    Hyperlipidemia     Hypertension     Sjoegren syndrome        Surgical history :   Past Surgical History:   Procedure Laterality Date    ABLATION OF DYSRHYTHMIC FOCUS  2021    EP Study / SVT ablation (AVNRT)    APPENDECTOMY      CORONARY ARTERY BYPASS GRAFT  2019    CABG x2 LIMA to LAD & LIMA to DIAG    CORONARY ARTERY BYPASS GRAFT N/A 2019    CABG CORONARY ARTERY BYPASS, endoscopic saphenous vein harvesting of the left leg, intraoperative KEEGAN performed by Brody Tillman MD at [address] [place] [address]         Family history:   Family History   Problem Relation Age of Onset    Alzheimer's Disease Mother     Diabetes Father     Heart Attack Father     Heart Attack Sister     Diabetes Sister        Social history :  reports that she quit smoking about 36 years ago. Her smoking use included cigarettes. She has never used smokeless tobacco. She reports that she does not drink alcohol and does not use drugs.     Allergies   Allergen Reactions    Bactrim [Sulfamethoxazole-Trimethoprim]     Sulfa Antibiotics Rash       Current Outpatient Medications on File Prior to Visit   Medication Sig Dispense Refill    ferrous sulfate (IRON 325) 325 (65 Fe) MG tablet TAKE 1 TABLET BY MOUTH EVERY DAY      minocycline (MINOCIN;DYNACIN) 50 MG capsule TAKE 1 CAPSULE BY MOUTH EVERY DAY      Ferrous Sulfate (IRON PO) Take by mouth      lisinopril (PRINIVIL;ZESTRIL) 20 MG tablet Take 1 tablet by mouth daily 30 tablet 3    rosuvastatin (CRESTOR) 40 MG tablet Take 1 tablet by mouth every evening 30 tablet 3    furosemide (LASIX) 20 MG tablet Take 1 tablet by mouth 2 times daily 180 tablet 1    carvedilol (COREG) 12.5 MG tablet       potassium chloride (KLOR-CON M) 20 MEQ extended release tablet Take 1 tablet by mouth daily 180 tablet 1    aspirin 81 MG EC tablet Take 1 tablet by mouth daily 30 tablet 3    Omega 3 1000 MG CAPS Take 1,000 mg by mouth 2 times daily       No current facility-administered medications on file prior to visit. Review of Systems:   Review of Systems   Constitutional: Negative for activity change, chills, fatigue and fever. HENT: Negative for congestion, sinus pressure and sinus pain. Eyes: Negative for redness and itching. Respiratory: Negative for cough, chest tightness, shortness of breath and wheezing. Cardiovascular: Negative for chest pain, palpitations and leg swelling. Gastrointestinal: Negative for abdominal pain, blood in stool, constipation, diarrhea, nausea and vomiting. Endocrine: Negative for cold intolerance and heat intolerance. Genitourinary: Negative for dysuria, flank pain and hematuria. Musculoskeletal: Negative for arthralgias, back pain, myalgias and neck stiffness. Skin: Negative for color change and rash. Allergic/Immunologic: Negative for food allergies. Neurological: Negative for dizziness, light-headedness, numbness and headaches. Hematological: Does not bruise/bleed easily. Psychiatric/Behavioral: Negative for agitation, behavioral problems and confusion. Examination:      Vitals:    07/27/21 0931   BP: 128/80   Pulse: 62   Weight: 147 lb 3.2 oz (66.8 kg)   Height: 5' 1\" (1.549 m)        Body mass index is 27.81 kg/m².       Physical Exam  Constitutional:       Appearance: She is well-developed. HENT:      Head: Normocephalic and atraumatic. Nose: No congestion. Mouth/Throat:      Mouth: Mucous membranes are moist.   Eyes:      Conjunctiva/sclera: Conjunctivae normal.   Neck:      Thyroid: No thyromegaly. Vascular: No JVD. Cardiovascular:      Rate and Rhythm: Normal rate and regular rhythm. Heart sounds: Normal heart sounds. No murmur heard. No friction rub. Pulmonary:      Effort: Pulmonary effort is normal. No respiratory distress. Breath sounds: Normal breath sounds. No stridor. No wheezing. Abdominal:      General: There is no distension. Palpations: Abdomen is soft. Tenderness: There is no abdominal tenderness. Musculoskeletal:         General: No tenderness. Normal range of motion. Cervical back: Normal range of motion. Skin:     General: Skin is warm and dry. Findings: No erythema. Neurological:      General: No focal deficit present. Mental Status: She is alert and oriented to person, place, and time.    Psychiatric:         Behavior: Behavior normal.               CBC:   Lab Results   Component Value Date    WBC 6.5 06/16/2021    HGB 12.9 06/16/2021    HCT 41.5 06/16/2021     06/16/2021     Lipids:  Lab Results   Component Value Date    CHOL 164 01/07/2020    TRIG 88 01/07/2020    HDL 74 (A) 01/07/2020    LDLCALC 72 01/07/2020    LDLDIRECT 109 (H) 03/13/2015     PT/INR:   Lab Results   Component Value Date    INR 0.97 06/16/2021        BMP:    Lab Results   Component Value Date     06/22/2021    K 4.3 06/22/2021     06/22/2021    CO2 21 06/22/2021    BUN 15 06/22/2021     CMP:   Lab Results   Component Value Date    AST 41 01/07/2020    PROT 7.2 03/16/2017    BILITOT 0.4 01/07/2020    ALKPHOS 69 01/07/2020     TSH:  No results found for: TSH    EKGINTERPRETATION - EKG Interpretation:  Sinus rhythm, LBBB      IMPRESSION / RECOMMENDATIONS:     Status post ablation of AVNRT  Hypertension    Patient is very thankful that she is feeling back to normal  She reports no further episodes of tachycardia  EKG obtained patient noted to be in sinus rhythm heart rate 62  Continue Coreg 12.5 mg twice daily and aspirin 81 mg daily    Vitals:    07/27/21 0931   BP: 128/80   Pulse: 62     Blood pressure is improved. Continue lisinopril 20 mg daily    Patient to follow-up in 3 months          Thanks again for allowing me to participate in care of this patient. Please call me if you have any questions. With best regards. DENA Brandon - CNP, 7/27/2021 9:42 AM     Please note this report has been partially produced using speech recognition software and may contain errors related to that system including errors in grammar, punctuation, and spelling, as well as words and phrases that may be inappropriate. If there are any questions or concerns please feel free to contact the dictating provider for clarification.

## 2021-08-16 ENCOUNTER — OFFICE VISIT (OUTPATIENT)
Dept: CARDIOLOGY CLINIC | Age: 76
End: 2021-08-16
Payer: COMMERCIAL

## 2021-08-16 VITALS
HEART RATE: 56 BPM | SYSTOLIC BLOOD PRESSURE: 134 MMHG | HEIGHT: 61 IN | DIASTOLIC BLOOD PRESSURE: 64 MMHG | WEIGHT: 145 LBS | BODY MASS INDEX: 27.38 KG/M2 | OXYGEN SATURATION: 100 %

## 2021-08-16 DIAGNOSIS — I65.23 BILATERAL CAROTID ARTERY STENOSIS: Primary | ICD-10-CM

## 2021-08-16 DIAGNOSIS — I10 ESSENTIAL HYPERTENSION: ICD-10-CM

## 2021-08-16 DIAGNOSIS — E78.5 DYSLIPIDEMIA: ICD-10-CM

## 2021-08-16 DIAGNOSIS — Z98.890 S/P CATHETER ABLATION OF SLOW PATHWAY: ICD-10-CM

## 2021-08-16 DIAGNOSIS — I38 VHD (VALVULAR HEART DISEASE): ICD-10-CM

## 2021-08-16 DIAGNOSIS — I25.10 CORONARY ARTERY DISEASE INVOLVING NATIVE CORONARY ARTERY OF NATIVE HEART WITHOUT ANGINA PECTORIS: ICD-10-CM

## 2021-08-16 DIAGNOSIS — Z86.79 S/P CATHETER ABLATION OF SLOW PATHWAY: ICD-10-CM

## 2021-08-16 PROCEDURE — 99214 OFFICE O/P EST MOD 30 MIN: CPT | Performed by: NURSE PRACTITIONER

## 2021-08-16 NOTE — ASSESSMENT & PLAN NOTE
-CABG x 1 in 2019 emergent due to dissection of LAD. Primary and secondary prevention discussed with patient:   -Low sodium cardiac diet   -exercise 30 min a day three days a week    Continue current medications aspirin, Coreg, lisinopril, Lasix, Crestor.

## 2021-08-16 NOTE — LETTER
K[address]  1945  [id number]    Have you had any Chest Pain that is not new? - No     Have you had any Shortness of Breath - No    Have you had any dizziness - Yes  If Yes DO ORTHOSTATIC BP - when do you feel dizzy with position change   How long does it last .2  seconds   Patient says this has occurred since open heart surgery. Have you had any palpitations that are not new? - No    Do you have any edema - swelling in legs    If Yes - CHECK TO SEE IF THE EDEMA IS PITTING  How long have they been having edema - Years  If Yes - Have they worn compression stockings Yes  If they have worn compression stockings 10 Years    Vein \"LEG PROBLEM Questionnaire\"  1. Do you have prominent leg veins? Yes   2. Do you have any skin discoloration? Yes  3. Do you have any healed or active sores? No  4. Do you have swelling of the legs? Yes  5. Do you have a family history of varicose veins? Yes  6. Does your profession involve pro-longed        standing or heavy lifting? No  7. Have you been fighting overweight problems? No  8. Do you have restless legs? No  9. Do you have any night time cramps? No  10.  Do you have any of the following in your legs:        Cramping (from lymphedema)     Do you have a surgery or procedure scheduled in the near future - No

## 2021-08-16 NOTE — PROGRESS NOTES
[facility] ([facility]    [address]  Phone: [phone]    Fax [phone]                  Lee Summers MD, Doron Rodriguez MD, Luis Antonio Joiner MD, MD Wendy Blue MD, Mariam Sims MD, Nikolay Crook MD, Mitra Bowser, DENA Hernandez, APRDANIA Buck, APRDANIA Buck, APRDANIA        Cardiology Progress Note      2021    RE: Felicitas Nowak  (1945)                             Primary cardiologist: Dr. Wendy Sosa       Subjective:  CC:   1. Bilateral carotid artery stenosis    2. VHD (valvular heart disease)    3. Coronary artery disease involving native coronary artery of native heart without angina pectoris    4. Essential hypertension    5. Dyslipidemia    6. S/P catheter ablation of slow pathway        HPI: Felicitas Nowak, who is a  66y.o. year old female with a past medical history as listed below. Patient presents to the office for follow up on CAD (CABG x 1), HTN, SVT, aortic stenosis, carotid stenosis, S/P ablation of AVNRT, and hyperlipidemia. Patient is  an active female who walks regularly. Patient is  compliant with medications. Patient denies any chest pain, shortness of breath, dizziness, syncope, or palpitations. Past Medical History:   Diagnosis Date    CAD (coronary artery disease)     COPD (chronic obstructive pulmonary disease) ([facility] [address] 2018    Begings of COPD as of today.  History of      History of cardiac cath 2019    severe LAD, multiple spot stenosis, mid LAD long segment    History of cardiac monitoring 2018    Sinus Rhythm with PSVT    History of exercise stress test 2019    treadmill, due to Dyspnea, Fatigue.     Hx of CABG 02/21/2019    X 2    Hyperlipidemia     Hypertension     Sjoegren syndrome        Current Outpatient Medications   Medication Sig Dispense Refill    Flaxseed, Linseed, (FLAXSEED OIL) 1200 MG CAPS Take 1,200 mg by mouth daily 1 capsule daily      ferrous sulfate (IRON 325) 325 (65 Fe) MG tablet TAKE 1 TABLET BY MOUTH EVERY DAY      minocycline (MINOCIN;DYNACIN) 50 MG capsule TAKE 1 CAPSULE BY MOUTH EVERY DAY      lisinopril (PRINIVIL;ZESTRIL) 20 MG tablet Take 1 tablet by mouth daily 30 tablet 3    rosuvastatin (CRESTOR) 40 MG tablet Take 1 tablet by mouth every evening 30 tablet 3    furosemide (LASIX) 20 MG tablet Take 1 tablet by mouth 2 times daily 180 tablet 1    carvedilol (COREG) 12.5 MG tablet       potassium chloride (KLOR-CON M) 20 MEQ extended release tablet Take 1 tablet by mouth daily 180 tablet 1    aspirin 81 MG EC tablet Take 1 tablet by mouth daily 30 tablet 3    Omega 3 1200 MG CAPS Take 1,200 mg by mouth 2 times daily        No current facility-administered medications for this visit. Review of Systems:  Review of Systems   Cardiovascular: Negative for chest pain, palpitations and leg swelling. Musculoskeletal: Negative. Skin: Negative. Neurological: Negative for dizziness and weakness. All other systems reviewed and are negative. Objective:      Physical Exam:  /64 (Site: Left Upper Arm, Position: Sitting, Cuff Size: Medium Adult)   Pulse 56   Ht 5' 1\" (1.549 m)   Wt 145 lb (65.8 kg)   SpO2 100%   BMI 27.40 kg/m²   Wt Readings from Last 3 Encounters:   08/16/21 145 lb (65.8 kg)   07/27/21 147 lb 3.2 oz (66.8 kg)   06/29/21 143 lb 6.4 oz (65 kg)     Body mass index is 27.4 kg/m². Physical exam:  Physical Exam  Constitutional:       Appearance: She is well-developed. Cardiovascular:      Rate and Rhythm: Normal rate and regular rhythm. Pulses: Intact distal pulses. Dorsalis pedis pulses are 2+ on the right side and 2+ on the left side. Posterior tibial pulses are 2+ on the right side and 2+ on the left side. Heart sounds: S1 normal and S2 normal. Murmur heard.    Harsh midsystolic murmur is present with a grade of 3/6 at the upper right sternal border radiating to the neck. Pulmonary:      Effort: Pulmonary effort is normal.      Breath sounds: Normal breath sounds. Musculoskeletal:         General: Normal range of motion. Skin:     General: Skin is warm and dry. Neurological:      Mental Status: She is alert and oriented to person, place, and time. DATA:  No results found for: CKTOTAL, CKMB, CKMBINDEX, TROPONINI  BNP:  No results found for: BNP  PT/INR:  No results found for: PTINR  Lab Results   Component Value Date    LABA1C 5.7 01/07/2020     Lab Results   Component Value Date    CHOL 164 01/07/2020    TRIG 88 01/07/2020    HDL 74 (A) 01/07/2020    LDLCALC 72 01/07/2020    LDLDIRECT 109 (H) 03/13/2015     Lab Results   Component Value Date    ALT 21 01/07/2020    AST 41 01/07/2020     TSH:  No results found for: TSH    Vitals:    08/16/21 0946   BP: 134/64   Pulse: 56   SpO2: 100%       Echo:6/1/20   Normal left ventricle structure and systolic function with an ejection   fraction of 55-60%. Dyskinetic septal wall motion. Moderately dilated right side. Calcific aortic valve with mild stenosis, mean gradient of 16 mmHg and an   SANDRO of 1.83 cm sq. Thickening of mitral valve leaflets is noted. Moderate tricuspid regurgitation. Moderate pulmonary hypertension at 51 mmHg. No evidence of pericardial effusion. Cath:2/21/19  severe LAD, multiple spot stenosis, mid LAD long segment of   stenosis, vessel is very tortous and calcified and balloon could   not cross distal LAD lesion, she had vessel spasm , required   NTG, nicardene to open for spasm, she required dopamine for   brief period and had VTACHs, required amiodarone IVP, AND IABP   was placed. FInal angiogram of LAD after balloon pump showed full vessel run   off and and JOSE III flow, she had multiple spots of stenosis   visible.       The 10-year ASCVD risk score (Stephanie Duong., et al., 2013) is: 28.4%    Values used to calculate the score:      Age: 66 years      Sex: Female      Is Non- : No      Diabetic: No      Tobacco smoker: No      Systolic Blood Pressure: 680 mmHg      Is BP treated: Yes      HDL Cholesterol: 74 mg/dL      Total Cholesterol: 164 mg/dL      Assessment/ Plan:     Coronary artery disease involving native coronary artery   -CABG x 1 in 2019 emergent due to dissection of LAD. Primary and secondary prevention discussed with patient:   -Low sodium cardiac diet   -exercise 30 min a day three days a week    Continue current medications aspirin, Coreg, lisinopril, Lasix, Crestor. Dyslipidemia   -At or near goal Yes    -She is to continue current medications (crestor 40 mg) Hepatic function panel WNL. No abdominal pain. No myalgias.     -The nature of cardiac risk has been fully discussed with this patient. I have made her aware of her LDL target goal given her cardiovascular risk analysis. I have discussed the appropriate diet. The need for lifelong compliance in order to reduce risk is stressed. A regular exercise program is recommended to help achieve and maintain normal body weight, fitness and improve lipid balance. A written copy of a low fat, low cholesterol diet has been given to the patient. Essential hypertension   -Stable, continue with Lasix 20 mg twice daily, lisinopril 20 mg daily, Coreg 12.5 mg twice daily    S/P catheter ablation of slow pathway   -Patient had ablation 3 months prior of AVNRT per Dr. Talon Pierson and reports doing well. VHD (valvular heart disease)   -On echo last year patient noted to have moderately dilated right side, calcified aortic valve with mild stenosis, moderate tricuspid regurgitation and moderate pulmonary hypertension. Carotid stenosis   -US in 2017 showed >70%, will get new US. Patient seen, interviewed and examined. Testing was reviewed.       Patient is encouraged to exercise even a brisk walk for 30 minutes at least 3 to 4 times a week. Lifestyle and risk factor modificatons discussed. Various goals are discussed and questions answered. Continue current medications. Appropriate prescriptions are addressed. Questions answered and patient verbalizes understanding. Call for any problems, questions, or concerns. Pt is to follow up in 6 months for Cardiac management    Electronically signed by DENA Spears CNP on 8/16/2021 at 10:29 AM

## 2021-08-16 NOTE — ASSESSMENT & PLAN NOTE
-At or near goal Yes    -She is to continue current medications (crestor 40 mg) Hepatic function panel WNL. No abdominal pain. No myalgias.     -The nature of cardiac risk has been fully discussed with this patient. I have made her aware of her LDL target goal given her cardiovascular risk analysis. I have discussed the appropriate diet. The need for lifelong compliance in order to reduce risk is stressed. A regular exercise program is recommended to help achieve and maintain normal body weight, fitness and improve lipid balance. A written copy of a low fat, low cholesterol diet has been given to the patient.

## 2021-08-16 NOTE — PATIENT INSTRUCTIONS
**It is YOUR responsibilty to bring medication bottles and/or updated medication list to [address] [place] [address]. This will allow us to better serve you and all your healthcare needs**    Please be informed that if you contact our office outside of normal business hours the physician on call cannot help with any scheduling or rescheduling issues, procedure instruction questions or any type of medication issue. We advise you for any urgent/emergency that you go to the nearest emergency room!     PLEASE CALL OUR OFFICE DURING NORMAL BUSINESS HOURS    Monday - Friday   8 am to 5 pm    [place]: [address]: [phone]    [place]:  [phone]

## 2021-09-02 ENCOUNTER — TELEPHONE (OUTPATIENT)
Dept: CARDIOLOGY CLINIC | Age: 76
End: 2021-09-02

## 2021-09-02 NOTE — TELEPHONE ENCOUNTER
Patient having discomfort in chest, pain  when she moves, dizziness. Advised to go to ED, patient would prefer not to go but will if worsens.  OV scheduled

## 2021-09-02 NOTE — TELEPHONE ENCOUNTER
Pt having cp x 1 day, in middle chest very tired No arm pain no jaw pain, little sick to stomach please advise.  Trying not to go to ED

## 2021-09-03 ENCOUNTER — HOSPITAL ENCOUNTER (OUTPATIENT)
Dept: GENERAL RADIOLOGY | Age: 76
Discharge: HOME | End: 2021-09-03
Payer: COMMERCIAL

## 2021-09-03 ENCOUNTER — OFFICE VISIT (OUTPATIENT)
Dept: CARDIOLOGY CLINIC | Age: 76
End: 2021-09-03
Payer: COMMERCIAL

## 2021-09-03 ENCOUNTER — HOSPITAL ENCOUNTER (OUTPATIENT)
Age: 76
Discharge: HOME | End: 2021-09-03
Payer: COMMERCIAL

## 2021-09-03 VITALS
WEIGHT: 145.2 LBS | SYSTOLIC BLOOD PRESSURE: 120 MMHG | HEIGHT: 61 IN | HEART RATE: 60 BPM | BODY MASS INDEX: 27.41 KG/M2 | DIASTOLIC BLOOD PRESSURE: 58 MMHG

## 2021-09-03 DIAGNOSIS — I25.10 CAD IN NATIVE ARTERY: Primary | ICD-10-CM

## 2021-09-03 DIAGNOSIS — I25.10 CAD IN NATIVE ARTERY: ICD-10-CM

## 2021-09-03 PROCEDURE — 71046 X-RAY EXAM CHEST 2 VIEWS: CPT

## 2021-09-03 PROCEDURE — 99214 OFFICE O/P EST MOD 30 MIN: CPT | Performed by: INTERNAL MEDICINE

## 2021-09-03 PROCEDURE — 93000 ELECTROCARDIOGRAM COMPLETE: CPT | Performed by: INTERNAL MEDICINE

## 2021-09-03 RX ORDER — ALBUTEROL SULFATE 90 UG/1
AEROSOL, METERED RESPIRATORY (INHALATION) PRN
COMMUNITY
Start: 2021-07-26 | End: 2022-03-08

## 2021-09-03 NOTE — PATIENT INSTRUCTIONS
**It is YOUR responsibilty to bring medication bottles and/or updated medication list to [address] [place] [address]. This will allow us to better serve you and all your healthcare needs**      Please be informed that if you contact our office outside of normal business hours the physician on call cannot help with any scheduling or rescheduling issues, procedure instruction questions or any type of medication issue. We advise you for any urgent/emergency that you go to the nearest emergency room!     PLEASE CALL OUR OFFICE DURING NORMAL BUSINESS HOURS    Monday - Friday   8 am to 5 pm    [place]: [address]: [phone]    [place]:  [phone]

## 2021-09-03 NOTE — LETTER
[address]  1945  [id number]    Have you had any Chest Pain that is not new? - Yes  If Yes DO EKG - How does it feel - Sharp Pain   How long does the pain last - 3 days when bending over    How long have you been having the pain - Day's   Did you take a NONE   And did it relieve the pain - the pain stops when she leans back upward      Have you had any Shortness of Breath - Yes  If Yes - When bending forward/over      Have you had any dizziness - Yes . Pt has had previous issues with dizziness that had been better since cardiac ablation. If Yes DO ORTHOSTATIC BP - when do you feel dizzy when bending forward   How long does it last .3  seconds       Have you had any palpitations that are not new? - Yes  If Yes DO EKG - Do you feel your heart fluttering  How long does it last - .2  seconds       Do you have any edema - swelling in legs  To feet due to Lymphedema  If Yes - CHECK TO SEE IF THE EDEMA IS PITTING  How long have they been having edema - Years  If Yes - Have they worn compression stockings Yes  If they have worn compression stockings years Years    When did you have your last labs drawn 6/22/21, 6/16/2021, in epic but no LIPIDS.  Pt had Lipids ck 33 mo ago  Where did you have them done [facility]  What doctor ordered Dr. Josefina Hutton      Do you have a surgery or procedure scheduled in the near future - No  If Yes- DO EKG
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
## 2021-09-15 ENCOUNTER — PROCEDURE VISIT (OUTPATIENT)
Dept: CARDIOLOGY CLINIC | Age: 76
End: 2021-09-15
Payer: COMMERCIAL

## 2021-09-15 DIAGNOSIS — Z98.890 S/P CATHETER ABLATION OF SLOW PATHWAY: ICD-10-CM

## 2021-09-15 DIAGNOSIS — I38 VHD (VALVULAR HEART DISEASE): ICD-10-CM

## 2021-09-15 DIAGNOSIS — Z86.79 S/P CATHETER ABLATION OF SLOW PATHWAY: ICD-10-CM

## 2021-09-15 DIAGNOSIS — E78.5 DYSLIPIDEMIA: ICD-10-CM

## 2021-09-15 DIAGNOSIS — I10 ESSENTIAL HYPERTENSION: ICD-10-CM

## 2021-09-15 DIAGNOSIS — I65.23 BILATERAL CAROTID ARTERY STENOSIS: Primary | ICD-10-CM

## 2021-09-15 DIAGNOSIS — I25.10 CORONARY ARTERY DISEASE INVOLVING NATIVE CORONARY ARTERY OF NATIVE HEART WITHOUT ANGINA PECTORIS: ICD-10-CM

## 2021-09-15 PROCEDURE — 93880 EXTRACRANIAL BILAT STUDY: CPT | Performed by: INTERNAL MEDICINE

## 2021-09-16 ENCOUNTER — TELEPHONE (OUTPATIENT)
Dept: CARDIOLOGY CLINIC | Age: 76
End: 2021-09-16

## 2021-09-17 ENCOUNTER — OFFICE VISIT (OUTPATIENT)
Dept: CARDIOLOGY CLINIC | Age: 76
End: 2021-09-17
Payer: COMMERCIAL

## 2021-09-17 VITALS
SYSTOLIC BLOOD PRESSURE: 158 MMHG | HEIGHT: 61 IN | HEART RATE: 60 BPM | BODY MASS INDEX: 27.45 KG/M2 | DIASTOLIC BLOOD PRESSURE: 80 MMHG | WEIGHT: 145.4 LBS

## 2021-09-17 DIAGNOSIS — I10 ESSENTIAL HYPERTENSION: ICD-10-CM

## 2021-09-17 DIAGNOSIS — E78.5 DYSLIPIDEMIA: ICD-10-CM

## 2021-09-17 DIAGNOSIS — Z86.79 S/P CATHETER ABLATION OF SLOW PATHWAY: ICD-10-CM

## 2021-09-17 DIAGNOSIS — I38 VHD (VALVULAR HEART DISEASE): ICD-10-CM

## 2021-09-17 DIAGNOSIS — I25.10 CORONARY ARTERY DISEASE INVOLVING NATIVE CORONARY ARTERY OF NATIVE HEART WITHOUT ANGINA PECTORIS: ICD-10-CM

## 2021-09-17 DIAGNOSIS — Z98.890 S/P CATHETER ABLATION OF SLOW PATHWAY: ICD-10-CM

## 2021-09-17 DIAGNOSIS — I65.23 BILATERAL CAROTID ARTERY STENOSIS: Primary | ICD-10-CM

## 2021-09-17 PROCEDURE — 99214 OFFICE O/P EST MOD 30 MIN: CPT | Performed by: NURSE PRACTITIONER

## 2021-09-17 NOTE — LETTER
[address]  1945  [id number]    Have you had any Chest Pain that is not new? - No    Have you had any Shortness of Breath - No  Patient walks on the treadmill and has no issues. Have you had any dizziness - a little sometimes  If Yes DO ORTHOSTATIC BP - when do you feel dizzy with position change   How long does it last - a few seconds  Has been dealing with this since open heart surgery. Have you had any palpitations that are not new? - No    Do you have any edema - swelling in ankles    If Yes - CHECK TO SEE IF THE EDEMA IS PITTING  How long have they been having edema - Years  If Yes - Have they worn compression stockings Yes  If they have worn compression stockings - 10 years     Vein \"LEG PROBLEM Questionnaire\"  1. Do you have prominent leg veins? Yes   2. Do you have any skin discoloration? No  3. Do you have any healed or active sores? No  4. Do you have swelling of the legs? Yes  5. Do you have a family history of varicose veins? Yes  6. Does your profession involve pro-longed        standing or heavy lifting? No  7. Have you been fighting overweight problems? Yes  8. Do you have restless legs? No  9. Do you have any night time cramps? No  10.  Do you have any of the following in your legs:        None    Do you have a surgery or procedure scheduled in the near future - No
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
## 2021-09-17 NOTE — ASSESSMENT & PLAN NOTE
-CABG x 1 2019, emergent due to dissection of LAD. Primary and secondary prevention discussed with patient:   -Low sodium cardiac diet   -exercise 30 min a day three days a week    Continue current medications ASA, Coreg, lisinopril, Lasix, crestor.

## 2021-09-17 NOTE — PROGRESS NOTES
[facility] ([facility] PHYSICAL [facility]    [address] [address],Third [address]  Phone: [phone]    Fax [phone]                  Zully Webb MD, Valentine Monet MD, Beatriz Mathews MD, MD Ruiz Mistry MD, Joanie De Jesus MD, Elba Santiago MD, Derrick Davis, DENA Khan, DENA Ward, APRDANIA Quinteros, DENA        Cardiology Progress Note      2021    RE: Ana Lilia Larkin  (1945)                             Primary cardiologist: Dr. Ruiz Gonzalez       Subjective:  CC:   1. Bilateral carotid artery stenosis    2. VHD (valvular heart disease)    3. Coronary artery disease involving native coronary artery of native heart without angina pectoris    4. Essential hypertension    5. Dyslipidemia    6. S/P catheter ablation of slow pathway        HPI: Ana Lilia Larkin, who is a  66y.o. year old female with a past medical history as listed below. Patient presents to the office for follow up on CAD (CABG x 1), HTN, SVT, aortic stenosis, carotid stenosis, S/P ablation of AVNRT, and hyperlipidemia. Patient reports two friends that have  this week and has been depressed. Patient is  an active female who walks regularly. Patient is  compliant with medications. Patient denies any chest pain, shortness of breath, dizziness, syncope, or palpitations. Past Medical History:   Diagnosis Date    CAD (coronary artery disease)     COPD (chronic obstructive pulmonary disease) ([facility] [address] 2018    Begings of COPD as of today.  History of      History of cardiac cath 2019    severe LAD, multiple spot stenosis, mid LAD long segment    History of cardiac monitoring 2018    Sinus Rhythm with PSVT    History of exercise stress test 2019    treadmill, due to Dyspnea, Fatigue.     Hx of CABG 02/21/2019    X 2    Hx of Doppler ultrasound 08/15/2021    Mod disease of the right proximal carotid artery. Mild disease of the left proximal internal carotid artery. The left external carotid artery exhibits significant stenosis.  Hyperlipidemia     Hypertension     Sjoegren syndrome        Current Outpatient Medications   Medication Sig Dispense Refill    Flaxseed, Linseed, (FLAXSEED OIL) 1200 MG CAPS Take 1,200 mg by mouth daily 1 capsule daily      ferrous sulfate (IRON 325) 325 (65 Fe) MG tablet TAKE 1 TABLET BY MOUTH EVERY DAY      minocycline (MINOCIN;DYNACIN) 50 MG capsule TAKE 1 CAPSULE BY MOUTH EVERY DAY      lisinopril (PRINIVIL;ZESTRIL) 20 MG tablet Take 1 tablet by mouth daily 30 tablet 3    rosuvastatin (CRESTOR) 40 MG tablet Take 1 tablet by mouth every evening 30 tablet 3    furosemide (LASIX) 20 MG tablet Take 1 tablet by mouth 2 times daily 180 tablet 1    carvedilol (COREG) 12.5 MG tablet Take 12.5 mg by mouth 2 times daily       potassium chloride (KLOR-CON M) 20 MEQ extended release tablet Take 1 tablet by mouth daily 180 tablet 1    aspirin 81 MG EC tablet Take 1 tablet by mouth daily 30 tablet 3    Omega 3 1200 MG CAPS Take 1,200 mg by mouth 2 times daily       albuterol sulfate  (90 Base) MCG/ACT inhaler as needed (Patient not taking: Reported on 9/3/2021)       No current facility-administered medications for this visit. Review of Systems:  Review of Systems   Respiratory: Negative for shortness of breath. Cardiovascular: Negative for chest pain, palpitations and leg swelling. Musculoskeletal: Negative. Skin: Negative. Neurological: Negative for dizziness and weakness. All other systems reviewed and are negative.          Objective:      Physical Exam:  BP (!) 158/80 (Site: Left Upper Arm, Position: Sitting, Cuff Size: Medium Adult)   Pulse 60   Ht 5' 1\" (1.549 m)   Wt 145 lb 6.4 oz (66 kg)   BMI 27.47 kg/m²   Wt Readings from Last 3 Encounters:   09/17/21 145 lb 6.4 oz (66 kg)   09/03/21 145 lb 3.2 oz (65.9 kg)   08/16/21 145 lb (65.8 kg) Body mass index is 27.47 kg/m². Physical exam:  Physical Exam  Constitutional:       Appearance: She is well-developed. Cardiovascular:      Rate and Rhythm: Normal rate and regular rhythm. Pulses: Intact distal pulses. Dorsalis pedis pulses are 2+ on the right side and 2+ on the left side. Posterior tibial pulses are 2+ on the right side and 2+ on the left side. Heart sounds: S1 normal and S2 normal. Murmur heard. Harsh midsystolic murmur is present with a grade of 3/6 at the upper right sternal border radiating to the neck. Pulmonary:      Effort: Pulmonary effort is normal.      Breath sounds: Normal breath sounds. Musculoskeletal:         General: Normal range of motion. Skin:     General: Skin is warm and dry. Neurological:      Mental Status: She is alert and oriented to person, place, and time. DATA:  No results found for: CKTOTAL, CKMB, CKMBINDEX, TROPONINI  BNP:  No results found for: BNP  PT/INR:  No results found for: PTINR  Lab Results   Component Value Date    LABA1C 5.7 01/07/2020     Lab Results   Component Value Date    CHOL 164 01/07/2020    TRIG 88 01/07/2020    HDL 74 (A) 01/07/2020    LDLCALC 72 01/07/2020    LDLDIRECT 109 (H) 03/13/2015     Lab Results   Component Value Date    ALT 21 01/07/2020    AST 41 01/07/2020     TSH:  No results found for: TSH    Vitals:    09/17/21 1052   BP: (!) 158/80   Pulse: 60       Echo:6/1/20   Normal left ventricle structure and systolic function with an ejection   fraction of 55-60%. Dyskinetic septal wall motion. Moderately dilated right side. Calcific aortic valve with mild stenosis, mean gradient of 16 mmHg and an SANDRO of 1.83 cm sq. Thickening of mitral valve leaflets is noted. Moderate tricuspid regurgitation. Moderate pulmonary hypertension at 51 mmHg. No evidence of pericardial effusion.     Cath:2/21/19  severe LAD, multiple spot stenosis, mid LAD long segment of   stenosis, vessel is very tortous and calcified and balloon could   not cross distal LAD lesion, she had vessel spasm , required   NTG, nicardene to open for spasm, she required dopamine for   brief period and had VTACHs, required amiodarone IVP, AND IABP   was placed. FInal angiogram of LAD after balloon pump showed full vessel run   off and and JOSE III flow, she had multiple spots of stenosis   visible. The 10-year ASCVD risk score (Yumiko Davenport, et al., 2013) is: 37.3%    Values used to calculate the score:      Age: 66 years      Sex: Female      Is Non- : No      Diabetic: No      Tobacco smoker: No      Systolic Blood Pressure: 660 mmHg      Is BP treated: Yes      HDL Cholesterol: 74 mg/dL      Total Cholesterol: 164 mg/dL      Assessment/ Plan:     S/P catheter ablation of slow pathway   -Patient had AVNRT ablation in June of 2021. Currently denies any palpitations. Coronary artery disease involving native coronary artery   -CABG x 1 2019, emergent due to dissection of LAD. Primary and secondary prevention discussed with patient:   -Low sodium cardiac diet   -exercise 30 min a day three days a week    Continue current medications ASA, Coreg, lisinopril, Lasix, crestor. VHD (valvular heart disease)   -Moderately dilated right side, calcified aortic valve w/ mild Calcific aortic valve with mild stenosis, mean gradient of 16 mmHg and an SANDRO of 1.83 cm sq. Dyslipidemia   -At or near goal Yes    -She is to continue current medications (Crestor 40 mg) Hepatic function panel WNL. No abdominal pain. No myalgias.     -The nature of cardiac risk has been fully discussed with this patient. I have made her aware of her LDL target goal given her cardiovascular risk analysis. I have discussed the appropriate diet. The need for lifelong compliance in order to reduce risk is stressed.  A regular exercise program is recommended to help achieve and maintain normal body weight, fitness and improve lipid balance. A written copy of a low fat, low cholesterol diet has been given to the patient. Carotid stenosis   -Recent US shows right ICA moderate disease and mild on left ICA. Continue to medically manage. Essential hypertension   -Stable, slight elevated with recent death of two friends this week. Previously stable, continue Coreg 12.5 mg, lisinopril 20 mg, lasix 20 mg BID. Patient seen, interviewed and examined. Testing was reviewed. Patient is encouraged to exercise even a brisk walk for 30 minutes at least 3 to 4 times a week. Lifestyle and risk factor modificatons discussed. Various goals are discussed and questions answered. Continue current medications. Appropriate prescriptions are addressed. Questions answered and patient verbalizes understanding. Call for any problems, questions, or concerns. Pt is to follow up in 6 months for Cardiac management    Electronically signed by DENA Gomez CNP on 9/20/2021 at 8:13 AM

## 2021-09-17 NOTE — ASSESSMENT & PLAN NOTE
-Patient had AVNRT ablation in June of 2021, currently denies any palpitations. Continue with Coreg 12.5 mg twice daily. Rt ij 8F Bloxy. Comp-none.

## 2021-09-20 ASSESSMENT — ENCOUNTER SYMPTOMS: SHORTNESS OF BREATH: 0

## 2021-10-01 ENCOUNTER — HOSPITAL ENCOUNTER (OUTPATIENT)
Dept: WOMENS IMAGING | Age: 76
Discharge: HOME | End: 2021-10-01
Payer: COMMERCIAL

## 2021-10-01 ENCOUNTER — HOSPITAL ENCOUNTER (OUTPATIENT)
Dept: ULTRASOUND IMAGING | Age: 76
Discharge: HOME | End: 2021-10-01
Payer: COMMERCIAL

## 2021-10-01 DIAGNOSIS — R92.8 FOLLOW-UP EXAMINATION OF ABNORMAL MAMMOGRAM: ICD-10-CM

## 2021-10-01 PROCEDURE — 76642 ULTRASOUND BREAST LIMITED: CPT

## 2021-10-01 PROCEDURE — 77066 DX MAMMO INCL CAD BI: CPT

## 2021-10-22 ENCOUNTER — HOSPITAL ENCOUNTER (OUTPATIENT)
Dept: GENERAL RADIOLOGY | Age: 76
Discharge: HOME | End: 2021-10-22
Payer: COMMERCIAL

## 2021-10-22 ENCOUNTER — HOSPITAL ENCOUNTER (OUTPATIENT)
Age: 76
Discharge: HOME | End: 2021-10-22
Payer: COMMERCIAL

## 2021-10-22 DIAGNOSIS — J18.9 UNRESOLVED PNEUMONIA: ICD-10-CM

## 2021-10-22 PROCEDURE — 71046 X-RAY EXAM CHEST 2 VIEWS: CPT

## 2021-11-01 ENCOUNTER — OFFICE VISIT (OUTPATIENT)
Dept: CARDIOLOGY CLINIC | Age: 76
End: 2021-11-01
Payer: COMMERCIAL

## 2021-11-01 VITALS
WEIGHT: 136 LBS | SYSTOLIC BLOOD PRESSURE: 120 MMHG | HEIGHT: 61 IN | HEART RATE: 60 BPM | BODY MASS INDEX: 25.68 KG/M2 | DIASTOLIC BLOOD PRESSURE: 70 MMHG

## 2021-11-01 DIAGNOSIS — I10 ESSENTIAL HYPERTENSION: ICD-10-CM

## 2021-11-01 DIAGNOSIS — Z86.79 S/P CATHETER ABLATION OF SLOW PATHWAY: Primary | ICD-10-CM

## 2021-11-01 DIAGNOSIS — Z98.890 S/P CATHETER ABLATION OF SLOW PATHWAY: Primary | ICD-10-CM

## 2021-11-01 PROCEDURE — 99214 OFFICE O/P EST MOD 30 MIN: CPT | Performed by: NURSE PRACTITIONER

## 2021-11-01 PROCEDURE — 93000 ELECTROCARDIOGRAM COMPLETE: CPT | Performed by: NURSE PRACTITIONER

## 2021-11-01 ASSESSMENT — ENCOUNTER SYMPTOMS
BLOOD IN STOOL: 0
EYE REDNESS: 0
CONSTIPATION: 0
SINUS PRESSURE: 0
CHEST TIGHTNESS: 0
WHEEZING: 0
SHORTNESS OF BREATH: 0
ABDOMINAL PAIN: 0
BACK PAIN: 0
NAUSEA: 0
SINUS PAIN: 0
COUGH: 0
VOMITING: 0
COLOR CHANGE: 0
DIARRHEA: 0
ABDOMINAL DISTENTION: 0
EYE ITCHING: 0

## 2021-11-01 NOTE — PROGRESS NOTES
palpitations and had happen even yesterday. Patient reports she has this episodes of rapid heartbeat without or with activity in the last few minutes usually. Patient has shortness of breath during that time. But she reports she has not breathing the same since her open heart procedure    Reports she likes to exercise and offload she has been noticing that she is having more palpitations with exercise and she does not feel good about it so she wants to know what is going on. Pastmedical history:   Past Medical History:   Diagnosis Date    CAD (coronary artery disease)     COPD (chronic obstructive pulmonary disease) ([facility] [address] 2018    Begings of COPD as of today.  History of      History of cardiac cath 2019    severe LAD, multiple spot stenosis, mid LAD long segment    History of cardiac monitoring 2018    Sinus Rhythm with PSVT    History of exercise stress test 2019    treadmill, due to Dyspnea, Fatigue.  Hx of CABG 02/21/2019    X 2    Hx of Doppler ultrasound 08/15/2021    Mod disease of the right proximal carotid artery. Mild disease of the left proximal internal carotid artery. The left external carotid artery exhibits significant stenosis.     Hyperlipidemia     Hypertension     Sjoegren syndrome        Surgical history :   Past Surgical History:   Procedure Laterality Date    ABLATION OF DYSRHYTHMIC FOCUS  2021    EP Study / SVT ablation (AVNRT)    APPENDECTOMY      CORONARY ARTERY BYPASS GRAFT  2019    CABG x2 LIMA to LAD & LIMA to DIAG    CORONARY ARTERY BYPASS GRAFT N/A 2019    CABG CORONARY ARTERY BYPASS, endoscopic saphenous vein harvesting of the left leg, intraoperative KEEGAN performed by Ramila Vines MD at [address] [place] [address] Drive         Family history:   Family History   Problem Relation Age of Onset    Alzheimer's Disease Mother     Diabetes Father     Heart Attack Father    [facility] Heart Attack Sister     Diabetes Sister        Social history :  reports that she quit smoking about 36 years ago. Her smoking use included cigarettes. She has never used smokeless tobacco. She reports that she does not drink alcohol and does not use drugs. Allergies   Allergen Reactions    Bactrim [Sulfamethoxazole-Trimethoprim]     Sulfa Antibiotics Rash       Current Outpatient Medications on File Prior to Visit   Medication Sig Dispense Refill    Flaxseed, Linseed, (FLAXSEED OIL) 1200 MG CAPS Take 1,200 mg by mouth daily 1 capsule daily      ferrous sulfate (IRON 325) 325 (65 Fe) MG tablet TAKE 1 TABLET BY MOUTH EVERY DAY      minocycline (MINOCIN;DYNACIN) 50 MG capsule TAKE 1 CAPSULE BY MOUTH EVERY DAY      lisinopril (PRINIVIL;ZESTRIL) 20 MG tablet Take 1 tablet by mouth daily 30 tablet 3    rosuvastatin (CRESTOR) 40 MG tablet Take 1 tablet by mouth every evening 30 tablet 3    furosemide (LASIX) 20 MG tablet Take 1 tablet by mouth 2 times daily 180 tablet 1    carvedilol (COREG) 12.5 MG tablet Take 12.5 mg by mouth 2 times daily       potassium chloride (KLOR-CON M) 20 MEQ extended release tablet Take 1 tablet by mouth daily 180 tablet 1    aspirin 81 MG EC tablet Take 1 tablet by mouth daily 30 tablet 3    Omega 3 1200 MG CAPS Take 1,200 mg by mouth 2 times daily       albuterol sulfate  (90 Base) MCG/ACT inhaler as needed (Patient not taking: Reported on 9/3/2021)       No current facility-administered medications on file prior to visit. Review of Systems:   Review of Systems   Constitutional: Negative for activity change, chills, fatigue and fever. HENT: Negative for congestion, sinus pressure and sinus pain. Eyes: Negative for redness, itching and visual disturbance. Respiratory: Negative for cough, chest tightness, shortness of breath and wheezing. Cardiovascular: Negative for chest pain, palpitations and leg swelling.    Gastrointestinal: Negative for abdominal distention, abdominal pain, blood in stool, constipation, diarrhea, nausea and vomiting. Endocrine: Negative for cold intolerance and heat intolerance. Genitourinary: Negative for difficulty urinating, dysuria and hematuria. Musculoskeletal: Negative for arthralgias, back pain, myalgias and neck stiffness. Skin: Negative for color change and rash. Allergic/Immunologic: Negative for food allergies. Neurological: Negative for dizziness, light-headedness, numbness and headaches. Hematological: Does not bruise/bleed easily. Psychiatric/Behavioral: Negative for agitation and confusion. The patient is not nervous/anxious. Examination:      Vitals:    11/01/21 1025   BP: 120/70   Pulse: 60   Weight: 136 lb (61.7 kg)   Height: 5' 1\" (1.549 m)        Body mass index is 25.7 kg/m². Physical Exam  Constitutional:       Appearance: She is well-developed. She is not ill-appearing or diaphoretic. HENT:      Head: Normocephalic and atraumatic. Right Ear: External ear normal.      Left Ear: External ear normal.      Mouth/Throat:      Mouth: Mucous membranes are moist.   Eyes:      General:         Right eye: No discharge. Left eye: No discharge. Conjunctiva/sclera: Conjunctivae normal.   Neck:      Thyroid: No thyromegaly. Vascular: No JVD. Cardiovascular:      Rate and Rhythm: Normal rate and regular rhythm. Heart sounds: Murmur (grade 2/6 systolic murmur) heard. No friction rub. Pulmonary:      Effort: Pulmonary effort is normal.      Breath sounds: Normal breath sounds. No wheezing or rhonchi. Abdominal:      Palpations: Abdomen is soft. Tenderness: There is no abdominal tenderness. Musculoskeletal:         General: Normal range of motion. Cervical back: Normal range of motion. Right lower leg: No edema. Left lower leg: No edema. Skin:     General: Skin is warm and dry. Neurological:      General: No focal deficit present. Mental Status: She is alert and oriented to person, place, and time. Psychiatric:         Mood and Affect: Mood normal.         Behavior: Behavior normal.               CBC:   Lab Results   Component Value Date    WBC 6.5 06/16/2021    HGB 12.9 06/16/2021    HCT 41.5 06/16/2021     06/16/2021     Lipids:  Lab Results   Component Value Date    CHOL 164 01/07/2020    TRIG 88 01/07/2020    HDL 74 (A) 01/07/2020    LDLCALC 72 01/07/2020    LDLDIRECT 109 (H) 03/13/2015     PT/INR:   Lab Results   Component Value Date    INR 0.97 06/16/2021        BMP:    Lab Results   Component Value Date     06/22/2021    K 4.3 06/22/2021     06/22/2021    CO2 21 06/22/2021    BUN 15 06/22/2021     CMP:   Lab Results   Component Value Date    AST 41 01/07/2020    PROT 7.2 03/16/2017    BILITOT 0.4 01/07/2020    ALKPHOS 69 01/07/2020     TSH:  No results found for: TSH    EKGINTERPRETATION - EKG Interpretation:  Sinus rhythm, LBBB      IMPRESSION / RECOMMENDATIONS:     Status post ablation of AVNRT  Hypertension      EKG obtained patient noted to be in sinus bradycardia rhythm heart rate 58  Patient noted to have PACs on EKG  Patient reports she has been feeling well with no further episodes of tachycardia or palpitations  Continue Coreg 12.5 mg twice daily and aspirin 81 mg daily    Vitals:    11/01/21 1025   BP: 120/70   Pulse: 60     Blood pressure is stable. Continue lisinopril 20 mg daily    Patient to follow-up in 6 months          Thanks again for allowing me to participate in care of this patient. Please call me if you have any questions. With best regards. Devika Felton, DENA - CNP, 11/1/2021 10:29 AM     Please note this report has been partially produced using speech recognition software and may contain errors related to that system including errors in grammar, punctuation, and spelling, as well as words and phrases that may be inappropriate.  If there are any questions or concerns please feel free to contact the dictating provider for clarification.

## 2021-11-01 NOTE — PATIENT INSTRUCTIONS
Please be informed that if you contact our office outside of normal business hours the physician on call cannot help with any scheduling or rescheduling issues, procedure instruction questions or any type of medication issue. We advise you for any urgent/emergency that you go to the nearest emergency room!     PLEASE CALL OUR OFFICE DURING NORMAL BUSINESS HOURS    Monday - Friday   8 am to 5 pm    [place] [address]: [phone]    [place]:  [phone]

## 2021-11-01 NOTE — LETTER
[address]  1945  [id number]    Have you had any Chest Pain that is not new? - No       Have you had any Shortness of Breath - No      Have you had any dizziness - Yes  If Yes DO ORTHOSTATIC BP - when do you feel dizzy with position change   How long does it last .8  seconds      Sitting wait 5 minutes do supine (laying down) wait 5 minutes then do standing - log each in \"vitals\" area in Epic   Be sure to ask what symptoms they are having if they get dizzy while completing ortho stats such as: room spinning, nausea, etc.    Have you had any palpitations that are not new? - No    Is the patient on any of the following medications -   If Yes DO EKG - Needs done every 6 months    Do you have any edema - swelling in No      When did you have your last labs drawn   Where did you have them done   What doctor ordered     If we do not have these labs you are retrieve these labs for these providers!     Do you have a surgery or procedure scheduled in the near future - No       Ask patient if they [facility] to [facility] if they are not already signed [facility]     Check to see if we have an E-MAIL on file for the patient     Check medication list thoroughly!!! AND RECONCILE OUTSIDE MEDICATIONS  If dose has changed change the entire order not just the MG  BE SURE TO ASK PATIENT IF THEY NEED MEDICATION REFILLS     At check out add to every patient's \"wrap up\" the following dot phrase AFTERHOURSEDUCATION and ensure we explain this to our patients

## 2022-03-08 ENCOUNTER — OFFICE VISIT (OUTPATIENT)
Dept: CARDIOLOGY CLINIC | Age: 77
End: 2022-03-08
Payer: COMMERCIAL

## 2022-03-08 VITALS
BODY MASS INDEX: 25.49 KG/M2 | DIASTOLIC BLOOD PRESSURE: 62 MMHG | WEIGHT: 135 LBS | HEIGHT: 61 IN | SYSTOLIC BLOOD PRESSURE: 118 MMHG | HEART RATE: 64 BPM

## 2022-03-08 DIAGNOSIS — E78.5 DYSLIPIDEMIA: ICD-10-CM

## 2022-03-08 DIAGNOSIS — I38 VHD (VALVULAR HEART DISEASE): ICD-10-CM

## 2022-03-08 DIAGNOSIS — I25.10 CORONARY ARTERY DISEASE INVOLVING NATIVE CORONARY ARTERY OF NATIVE HEART WITHOUT ANGINA PECTORIS: ICD-10-CM

## 2022-03-08 DIAGNOSIS — I65.23 BILATERAL CAROTID ARTERY STENOSIS: Primary | ICD-10-CM

## 2022-03-08 DIAGNOSIS — Z86.79 S/P CATHETER ABLATION OF SLOW PATHWAY: ICD-10-CM

## 2022-03-08 DIAGNOSIS — I47.1 SUPRAVENTRICULAR TACHYCARDIA (HCC): ICD-10-CM

## 2022-03-08 DIAGNOSIS — Z98.890 S/P CATHETER ABLATION OF SLOW PATHWAY: ICD-10-CM

## 2022-03-08 DIAGNOSIS — I10 ESSENTIAL HYPERTENSION: ICD-10-CM

## 2022-03-08 PROBLEM — I47.10 SUPRAVENTRICULAR TACHYCARDIA: Status: ACTIVE | Noted: 2022-03-08

## 2022-03-08 PROCEDURE — 99214 OFFICE O/P EST MOD 30 MIN: CPT | Performed by: NURSE PRACTITIONER

## 2022-03-08 ASSESSMENT — ENCOUNTER SYMPTOMS: SHORTNESS OF BREATH: 0

## 2022-03-08 NOTE — PATIENT INSTRUCTIONS
**It is YOUR responsibilty to bring medication bottles and/or updated medication list to [address] [place] [address]. This will allow us to better serve you and all your healthcare needs**  Please be informed that if you contact our office outside of normal business hours the physician on call cannot help with any scheduling or rescheduling issues, procedure instruction questions or any type of medication issue. We advise you for any urgent/emergency that you go to the nearest emergency room!     PLEASE CALL OUR OFFICE DURING NORMAL BUSINESS HOURS    Monday - Friday   8 am to 5 pm    [place]: [address]: [phone]    [place]:  [phone]

## 2022-03-08 NOTE — PROGRESS NOTES
[address], [address] [facility],Third Floor  Phone: [phone]    Fax [phone]                  Renuka Aaron MD, Rebecca Hurley MD, Mustapha Catalan MD, MD Opal Younger MD, Lupe Wang MD, Nakita Ibrahim MD, Basilia Johnson, DENA       Sonja Goodpasture, DENA  Johnsdonte Puls, APRN       Amilcar Side, APRN        Cardiology Progress Note      3/8/2022    RE: Asha German  (1945)                             Primary cardiologist: Dr. Opal Small       Subjective:  CC:   1. Bilateral carotid artery stenosis    2. Supraventricular tachycardia ([facility] [address]    3. VHD (valvular heart disease)    4. Coronary artery disease involving native coronary artery of native heart without angina pectoris    5. Essential hypertension    6. Dyslipidemia    7. S/P catheter ablation of slow pathway        HPI: Asha German, who is a  64y.o. year old female with a past medical history as listed below. Patient presents to the office for follow up on CAD (CABG x 2019), HTN, SVT, aortic stenosis, carotid stenosis, S/P ablation of AVNRT, and hyperlipidemia. Patient had ablation of AVNRT in 2021. Patient walks daily for 45 min on treadmill. Patient is  an active female who walks regularly. Patient is  compliant with medications. Patient denies any chest pain, shortness of breath, dizziness, syncope, or palpitations. Past Medical History:   Diagnosis Date    CAD (coronary artery disease)     COPD (chronic obstructive pulmonary disease) ([facility] [address] 2018    Begings of COPD as of today.  History of      History of cardiac cath 2019    severe LAD, multiple spot stenosis, mid LAD long segment    History of cardiac monitoring 2018    Sinus Rhythm with PSVT    History of exercise stress test 2019    treadmill, due to Dyspnea, Fatigue.     Hx of CABG 02/21/2019    X 2    Hx of Doppler ultrasound 08/15/2021    Mod disease of the right proximal carotid artery. Mild disease of the left proximal internal carotid artery. The left external carotid artery exhibits significant stenosis.  Hyperlipidemia     Hypertension     Sjoegren syndrome        Current Outpatient Medications   Medication Sig Dispense Refill    Flaxseed, Linseed, (FLAXSEED OIL) 1200 MG CAPS Take 1,200 mg by mouth daily 1 capsule daily      ferrous sulfate (IRON 325) 325 (65 Fe) MG tablet TAKE 1 TABLET BY MOUTH EVERY DAY      minocycline (MINOCIN;DYNACIN) 50 MG capsule TAKE 1 CAPSULE BY MOUTH EVERY DAY      lisinopril (PRINIVIL;ZESTRIL) 20 MG tablet Take 1 tablet by mouth daily 30 tablet 3    rosuvastatin (CRESTOR) 40 MG tablet Take 1 tablet by mouth every evening 30 tablet 3    furosemide (LASIX) 20 MG tablet Take 1 tablet by mouth 2 times daily 180 tablet 1    carvedilol (COREG) 12.5 MG tablet Take 12.5 mg by mouth 2 times daily       potassium chloride (KLOR-CON M) 20 MEQ extended release tablet Take 1 tablet by mouth daily 180 tablet 1    aspirin 81 MG EC tablet Take 1 tablet by mouth daily 30 tablet 3    Omega 3 1200 MG CAPS Take 1,200 mg by mouth 2 times daily        No current facility-administered medications for this visit. Review of Systems:  Review of Systems   Respiratory: Negative for shortness of breath. Cardiovascular: Negative for chest pain, palpitations and leg swelling. Musculoskeletal: Negative. Skin: Negative. Neurological: Negative for dizziness and weakness. All other systems reviewed and are negative. Objective:      Physical Exam:  /62 (Site: Left Upper Arm, Position: Sitting, Cuff Size: Medium Adult)   Pulse 64   Ht 5' 1\" (1.549 m)   Wt 135 lb (61.2 kg)   BMI 25.51 kg/m²   Wt Readings from Last 3 Encounters:   03/08/22 135 lb (61.2 kg)   11/01/21 136 lb (61.7 kg)   09/17/21 145 lb 6.4 oz (66 kg)     Body mass index is 25.51 kg/m².     Physical exam:  Physical Exam  Constitutional:       Appearance: She is well-developed. Cardiovascular:      Rate and Rhythm: Normal rate and regular rhythm. Pulses: Intact distal pulses. Dorsalis pedis pulses are 2+ on the right side and 2+ on the left side. Posterior tibial pulses are 2+ on the right side and 2+ on the left side. Heart sounds: S1 normal and S2 normal. Murmur heard. Harsh midsystolic murmur is present with a grade of 3/6 at the upper right sternal border radiating to the neck. Pulmonary:      Effort: Pulmonary effort is normal.      Breath sounds: Normal breath sounds. Musculoskeletal:         General: Normal range of motion. Skin:     General: Skin is warm and dry. Neurological:      Mental Status: She is alert and oriented to person, place, and time. DATA:  No results found for: CKTOTAL, CKMB, CKMBINDEX, TROPONINI  BNP:  No results found for: BNP  PT/INR:  No results found for: PTINR  Lab Results   Component Value Date    LABA1C 5.7 01/07/2020     Lab Results   Component Value Date    CHOL 164 01/07/2020    TRIG 88 01/07/2020    HDL 74 (A) 01/07/2020    LDLCALC 72 01/07/2020    LDLDIRECT 109 (H) 03/13/2015     Lab Results   Component Value Date    ALT 21 01/07/2020    AST 41 01/07/2020     TSH:  No results found for: TSH    Vitals:    03/08/22 1307   BP: 118/62   Pulse: 64       Echo:6/1/20   Normal left ventricle structure and systolic function with an ejection   fraction of 55-60%. Dyskinetic septal wall motion. Moderately dilated right side. Calcific aortic valve with mild stenosis, mean gradient of 16 mmHg and an SANDRO of 1.83 cm sq. Thickening of mitral valve leaflets is noted. Moderate tricuspid regurgitation. Moderate pulmonary hypertension at 51 mmHg. No evidence of pericardial effusion.     Cath:2/21/19  severe LAD, multiple spot stenosis, mid LAD long segment of   stenosis, vessel is very tortous and calcified and balloon could   not cross distal LAD lesion, she had vessel spasm , required   NTG, nicardene to open for spasm, she required dopamine for   brief period and had VTACHs, required amiodarone IVP, AND IABP   was placed. FInal angiogram of LAD after balloon pump showed full vessel run   off and and JOSE III flow, she had multiple spots of stenosis   visible. The 10-year ASCVD risk score (Darling Gallagher., et al., 2013) is: 25.6%    Values used to calculate the score:      Age: 64 years      Sex: Female      Is Non- : No      Diabetic: No      Tobacco smoker: No      Systolic Blood Pressure: 383 mmHg      Is BP treated: Yes      HDL Cholesterol: 74 mg/dL      Total Cholesterol: 164 mg/dL      Assessment/ Plan:     S/P catheter ablation of slow pathway   -Patient had AVNRT ablation in June of 2021. Currently denies any palpitations. Reports one cup of coffee a day. SVT  -Controlled, no recurrence since ablation. Coronary artery disease involving native coronary artery   -CABG x 1 2019, emergent due to dissection of LAD. Primary and secondary prevention discussed with patient:   -Low sodium cardiac diet   -exercise 30 min a day three days a week    Continue current medications ASA, Coreg, lisinopril, Lasix, crestor. VHD (valvular heart disease)   -Moderately dilated right side, calcified aortic valve w/ mild Calcific aortic valve with mild stenosis, mean gradient of 16 mmHg and an SANDRO of 1.83 cm sq. Dyslipidemia   -At or near goal Yes, recent labs since    -She is to continue current medications (Crestor 40 mg) Hepatic function panel WNL. No abdominal pain. No myalgias.     -The nature of cardiac risk has been fully discussed with this patient. I have made her aware of her LDL target goal given her cardiovascular risk analysis. I have discussed the appropriate diet. The need for lifelong compliance in order to reduce risk is stressed.  A regular exercise program is recommended to help achieve and maintain normal body weight, fitness and improve lipid balance. A written copy of a low fat, low cholesterol diet has been given to the patient. Carotid stenosis   - US 9/15/21, shows right ICA moderate disease and mild on left ICA. Continue to medically manage. Essential hypertension   -Stable, continue Coreg 12.5 mg, lisinopril 20 mg, lasix 20 mg BID. Patient seen, interviewed and examined. Testing was reviewed. Patient is encouraged to exercise even a brisk walk for 30 minutes at least 3 to 4 times a week. Lifestyle and risk factor modificatons discussed. Various goals are discussed and questions answered. Continue current medications. Appropriate prescriptions are addressed. Questions answered and patient verbalizes understanding. Call for any problems, questions, or concerns. Pt is to follow up in 6 months for Cardiac management    Electronically signed by Sherif Addison.  DENA Snell CNP on 3/8/2022 at 1:40 PM

## 2022-05-10 ENCOUNTER — OFFICE VISIT (OUTPATIENT)
Dept: CARDIOLOGY CLINIC | Age: 77
End: 2022-05-10
Payer: COMMERCIAL

## 2022-05-10 VITALS
HEIGHT: 61 IN | OXYGEN SATURATION: 95 % | WEIGHT: 141 LBS | BODY MASS INDEX: 26.62 KG/M2 | SYSTOLIC BLOOD PRESSURE: 128 MMHG | DIASTOLIC BLOOD PRESSURE: 68 MMHG | HEART RATE: 64 BPM

## 2022-05-10 DIAGNOSIS — R01.1 MURMUR: Primary | ICD-10-CM

## 2022-05-10 PROCEDURE — 99214 OFFICE O/P EST MOD 30 MIN: CPT | Performed by: INTERNAL MEDICINE

## 2022-05-10 NOTE — PROGRESS NOTES
Keenan Maza MD        OFFICE  FOLLOWUP NOTE    Chief complaints: patient is here for management of CAD, cab LIMA TO LAD,Hyperlipidemia; Hypertension;Sjoegren syndrome, lymphedema, mild AS    Subjective: patient feels her chest sternotomy wire is poking out, no chest pain, no shortness of breath, + dizziness, no palpitations    HPI Reba Hartmann is a 64 y. o.year old who  has a past medical history of CAD (coronary artery disease), COPD (chronic obstructive pulmonary disease) ([facility]), History of , History of cardiac cath, History of cardiac monitoring, History of exercise stress test, Hx of CABG, Hx of Doppler ultrasound, Hyperlipidemia, Hypertension, and Sjoegren syndrome. and presents for management of CAD, cab LIMA TO LAD,Hyperlipidemia; Hypertension;Sjoegren syndrome, lymphedema, mild AS which are well controlled      Current Outpatient Medications   Medication Sig Dispense Refill    Flaxseed, Linseed, (FLAXSEED OIL) 1200 MG CAPS Take 1,200 mg by mouth daily 1 capsule daily      ferrous sulfate (IRON 325) 325 (65 Fe) MG tablet TAKE 1 TABLET BY MOUTH EVERY DAY      minocycline (MINOCIN;DYNACIN) 50 MG capsule TAKE 1 CAPSULE BY MOUTH EVERY DAY      lisinopril (PRINIVIL;ZESTRIL) 20 MG tablet Take 1 tablet by mouth daily 30 tablet 3    rosuvastatin (CRESTOR) 40 MG tablet Take 1 tablet by mouth every evening 30 tablet 3    furosemide (LASIX) 20 MG tablet Take 1 tablet by mouth 2 times daily 180 tablet 1    carvedilol (COREG) 12.5 MG tablet Take 12.5 mg by mouth 2 times daily       potassium chloride (KLOR-CON M) 20 MEQ extended release tablet Take 1 tablet by mouth daily 180 tablet 1    aspirin 81 MG EC tablet Take 1 tablet by mouth daily 30 tablet 3    Omega 3 1200 MG CAPS Take 1,200 mg by mouth 2 times daily        No current facility-administered medications for this visit.      Allergies: Sulfa antibiotics  Past Medical History:   Diagnosis Date    CAD (coronary artery disease)     COPD (chronic obstructive pulmonary disease) ([facility] [address] 2018    Begings of COPD as of today.  History of      History of cardiac cath 2019    severe LAD, multiple spot stenosis, mid LAD long segment    History of cardiac monitoring 2018    Sinus Rhythm with PSVT    History of exercise stress test 2019    treadmill, due to Dyspnea, Fatigue.  Hx of CABG 02/21/2019    X 2    Hx of Doppler ultrasound 08/15/2021    Mod disease of the right proximal carotid artery. Mild disease of the left proximal internal carotid artery. The left external carotid artery exhibits significant stenosis.     Hyperlipidemia     Hypertension     Sjoegren syndrome      Past Surgical History:   Procedure Laterality Date    ABLATION OF DYSRHYTHMIC FOCUS  2021    EP Study / SVT ablation (AVNRT)    APPENDECTOMY      CORONARY ARTERY BYPASS GRAFT  2019    CABG x2 LIMA to LAD & LIMA to DIAG    CORONARY ARTERY BYPASS GRAFT N/A 2019    CABG CORONARY ARTERY BYPASS, endoscopic saphenous vein harvesting of the left leg, intraoperative KEEGAN performed by Ramiro Falcon MD at [address] [place] [address] Drive       Family History   Problem Relation Age of Onset    Alzheimer's Disease Mother     Diabetes Father     Heart Attack Father     Heart Attack Sister     Diabetes Sister      Social History     Tobacco Use    Smoking status: Former Smoker     Types: Cigarettes     Quit date:      Years since quittin.3    Smokeless tobacco: Never Used   Substance Use Topics    Alcohol use: No     Comment: Caffeine: 1 cup of coffee (not every day)      [unfilled]  Review of Systems:   · Constitutional: No Fever or Weight Loss   · Eyes: No Decreased Vision  · ENT: No Headaches, Hearing Loss or Vertigo  · Cardiovascular: No chest pain, dyspnea on exertion, palpitations or loss of consciousness  · Respiratory: No cough or wheezing    · Gastrointestinal: No abdominal pain, appetite loss, blood in stools, constipation, diarrhea or heartburn  · Genitourinary: No dysuria, trouble voiding, or hematuria  · Musculoskeletal:  No gait disturbance, weakness or joint complaints  · Integumentary: No rash or pruritis  · Neurological: No TIA or stroke symptoms  · Psychiatric: No anxiety or depression  · Endocrine: No malaise, fatigue or temperature intolerance  · Hematologic/Lymphatic: No bleeding problems, blood clots or swollen lymph nodes  · Allergic/Immunologic: No nasal congestion or hives  All systems negative except as marked. Objective:  /68 (Site: Left Upper Arm, Position: Sitting, Cuff Size: Medium Adult)   Pulse 64   Ht 5' 1\" (1.549 m)   Wt 141 lb (64 kg)   SpO2 95%   BMI 26.64 kg/m²   Wt Readings from Last 3 Encounters:   05/10/22 141 lb (64 kg)   03/08/22 135 lb (61.2 kg)   11/01/21 136 lb (61.7 kg)     Body mass index is 26.64 kg/m². GENERAL - Alert, oriented, pleasant, in no apparent distress,normal grooming  HEENT  pupils are intact, cornea intact, conjunctive normal color, ears are normal in exam,  Neck - Supple. No jugular venous distention noted. No carotid bruits, no apical -carotid delay  Respiratory:  Normal breath sounds, No respiratory distress, No wheezing, No chest tenderness. ,no use of accessory muscles, diaphragm movement is normal  Cardiovascular: (PMI) apex non displaced,no lifts no thrills, no s3,no s4, Normal heart rate, Normal rhythm, + murmurs, No rubs, No gallops.  Carotid arteries pulse and amplitude are normal no bruit, no abdominal bruit noted ( normal abdominal aorta ausculation),   Extremities - No cyanosis, clubbing, or significant edema, no varicose veins    Abdomen  No masses, tenderness, or organomegaly, no hepato-splenomegally, no bruits  Musculoskeletal  No significant edema, no kyphosis or scoliosis, no deformity in any extremity noted, muscle strength and tone are normal  Skin: no ulcer,no scar,no stasis dermatitis Neurologic  alert oriented times 3,Cranial nerves II through XII are grossly intact. There were no gross focal neurologic abnormalities. Psychiatric: normal mood and affect    No results found for: CKTOTAL, CKMB, CKMBINDEX, TROPONINI  BNP:  No results found for: BNP  PT/INR:  No results found for: PTINR  Lab Results   Component Value Date    LABA1C 5.7 2020     Lab Results   Component Value Date    CHOL 164 2020    TRIG 88 2020    HDL 74 (A) 2020    LDLCALC 72 2020    LDLDIRECT 109 (H) 2015     Lab Results   Component Value Date    ALT 21 2020    AST 41 2020     TSH:  No results found for: TSH    Impression:  Steve Escobedo is a 64 y. o.year old who  has a past medical history of CAD (coronary artery disease), COPD (chronic obstructive pulmonary disease) ([facility]), History of , History of cardiac cath, History of cardiac monitoring, History of exercise stress test, Hx of CABG, Hx of Doppler ultrasound, Hyperlipidemia, Hypertension, and Sjoegren syndrome. and presents with     Plan:  1. Chest pain: most likley reproducible with sternotomy clip, she lifted 20 lbs flower had chest xray and will recommend to see CT sx  2. Palpitations:stable after SVT ablation, continue coreg  3. CAD: s/p CABG koenig TO LAD, patient had dissection of LAD WITH angioplasty attempt, Continue aspirin,betablockers, statins, stress test was normal  4. She had carotid cheked at [facility] office,   5. Murmur: mild aortic stenosis, SANDRO 1.83 in , will treat conservatively. Repeat echo  6. Dyslipidemia: continue statins, will get labs from PMD   7. HTN: stable, continue coreg medications and lisinopril  8. sjogren syndrome: stable  9. H/O dizziness since [facility]     All labs, medications and tests reviewed, continue all other medications of all above medical condition listed as is.     [unfilled]

## 2022-05-10 NOTE — PATIENT INSTRUCTIONS
Please be informed that if you contact our office outside of normal business hours the physician on call cannot help with any scheduling or rescheduling issues, procedure instruction questions or any type of medication issue. We advise you for any urgent/emergency that you go to the nearest emergency room! PLEASE CALL OUR OFFICE DURING NORMAL BUSINESS HOURS    Monday - Friday   8 am to 5 pm    [place]: [address]: [phone]    [place]:  [phone]    **It is YOUR responsibilty to bring medication bottles and/or updated medication list to [address] [place] [address].  This will allow us to better serve you and all your healthcare needs**

## 2022-05-25 ENCOUNTER — PROCEDURE VISIT (OUTPATIENT)
Dept: CARDIOLOGY CLINIC | Age: 77
End: 2022-05-25
Payer: COMMERCIAL

## 2022-05-25 DIAGNOSIS — I38 VHD (VALVULAR HEART DISEASE): ICD-10-CM

## 2022-05-25 DIAGNOSIS — R01.1 MURMUR: ICD-10-CM

## 2022-05-25 LAB
LV EF: 58 %
LVEF MODALITY: NORMAL

## 2022-05-25 PROCEDURE — 93306 TTE W/DOPPLER COMPLETE: CPT | Performed by: INTERNAL MEDICINE

## 2022-05-31 ENCOUNTER — TELEPHONE (OUTPATIENT)
Dept: CARDIOLOGY CLINIC | Age: 77
End: 2022-05-31

## 2022-05-31 NOTE — TELEPHONE ENCOUNTER
Summary   Left ventricular function is normal, EF 55-60%. Mild concentric left ventricular hypertrophy. Possible dyskinesis of the Basal vandana-septal segment   Bilateral atrial dilatation. Dilated right ventricle. Moderate aortic stenosis with mean gradient of 20 mmHg, with an SANDRO of 1.32   cm^2. Moderate aortic insufficiency with PHT of 326 msec. Thickening of the mitral valve leaflet. Mild calcification of the mitral valve noted. Moderate tricuspid regurgitation. Moderately elevated pulmonary artery pressure, RVSP 49 mmHg. 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 No evidence of pericardial effusion. Spoke to patient regarding results of echo. Patient voiced understanding.

## 2022-10-05 ENCOUNTER — HOSPITAL ENCOUNTER (OUTPATIENT)
Dept: WOMENS IMAGING | Age: 77
Discharge: HOME | End: 2022-10-05
Payer: COMMERCIAL

## 2022-10-05 DIAGNOSIS — M81.0 POSTMENOPAUSAL OSTEOPOROSIS: ICD-10-CM

## 2022-10-05 DIAGNOSIS — Z12.31 ENCOUNTER FOR SCREENING MAMMOGRAM FOR MALIGNANT NEOPLASM OF BREAST: ICD-10-CM

## 2022-10-05 PROCEDURE — 77063 BREAST TOMOSYNTHESIS BI: CPT

## 2022-10-05 PROCEDURE — 77080 DXA BONE DENSITY AXIAL: CPT

## 2022-11-14 ENCOUNTER — HOSPITAL ENCOUNTER (OUTPATIENT)
Age: 77
Discharge: HOME | End: 2022-11-14
Payer: COMMERCIAL

## 2022-11-14 ENCOUNTER — OFFICE VISIT (OUTPATIENT)
Dept: CARDIOLOGY CLINIC | Age: 77
End: 2022-11-14
Payer: COMMERCIAL

## 2022-11-14 VITALS
HEIGHT: 60 IN | OXYGEN SATURATION: 98 % | SYSTOLIC BLOOD PRESSURE: 134 MMHG | HEART RATE: 54 BPM | DIASTOLIC BLOOD PRESSURE: 62 MMHG | BODY MASS INDEX: 27.8 KG/M2 | WEIGHT: 141.6 LBS

## 2022-11-14 DIAGNOSIS — Z86.79 S/P CATHETER ABLATION OF SLOW PATHWAY: Primary | ICD-10-CM

## 2022-11-14 DIAGNOSIS — Z98.890 S/P CATHETER ABLATION OF SLOW PATHWAY: Primary | ICD-10-CM

## 2022-11-14 DIAGNOSIS — I25.10 CORONARY ARTERY DISEASE INVOLVING NATIVE CORONARY ARTERY OF NATIVE HEART WITHOUT ANGINA PECTORIS: ICD-10-CM

## 2022-11-14 LAB
ALBUMIN SERPL-MCNC: 4.5 GM/DL (ref 3.4–5)
ALP BLD-CCNC: 93 IU/L (ref 40–129)
ALT SERPL-CCNC: 16 U/L (ref 10–40)
AST SERPL-CCNC: 40 IU/L (ref 15–37)
BILIRUB SERPL-MCNC: 0.6 MG/DL (ref 0–1)
BILIRUBIN DIRECT: 0.2 MG/DL (ref 0–0.3)
BILIRUBIN, INDIRECT: 0.4 MG/DL (ref 0–0.7)
CHOLESTEROL: 162 MG/DL
HDLC SERPL-MCNC: 64 MG/DL
LDL CHOLESTEROL CALCULATED: 78 MG/DL
TOTAL PROTEIN: 6.5 GM/DL (ref 6.4–8.2)
TRIGL SERPL-MCNC: 102 MG/DL

## 2022-11-14 PROCEDURE — 99214 OFFICE O/P EST MOD 30 MIN: CPT | Performed by: INTERNAL MEDICINE

## 2022-11-14 PROCEDURE — 93000 ELECTROCARDIOGRAM COMPLETE: CPT | Performed by: INTERNAL MEDICINE

## 2022-11-14 PROCEDURE — 3074F SYST BP LT 130 MM HG: CPT | Performed by: INTERNAL MEDICINE

## 2022-11-14 PROCEDURE — 3078F DIAST BP <80 MM HG: CPT | Performed by: INTERNAL MEDICINE

## 2022-11-14 PROCEDURE — 80061 LIPID PANEL: CPT

## 2022-11-14 PROCEDURE — 1123F ACP DISCUSS/DSCN MKR DOCD: CPT | Performed by: INTERNAL MEDICINE

## 2022-11-14 PROCEDURE — 36415 COLL VENOUS BLD VENIPUNCTURE: CPT

## 2022-11-14 PROCEDURE — 80076 HEPATIC FUNCTION PANEL: CPT

## 2022-11-14 RX ORDER — CARVEDILOL 12.5 MG/1
6.25 TABLET ORAL 2 TIMES DAILY
Qty: 60 TABLET | Refills: 3
Start: 2022-11-14 | End: 2022-11-14

## 2022-11-14 RX ORDER — DAPAGLIFLOZIN 5 MG/1
TABLET, FILM COATED ORAL
COMMUNITY
Start: 2022-09-30

## 2022-11-14 NOTE — PROGRESS NOTES
Diane Fofana MD        OFFICE  FOLLOWUP NOTE    Chief complaints: patient is here for management of CAD, cab LIMA TO LAD,Hyperlipidemia; Hypertension;Sjoegren syndrome, lymphedema, mild AS, carotid stenosis    Subjective: patient feels better, no chest pain, no shortness of breath, no dizziness, no palpitations    HPI Hitesh Manriquez is a 64 y. o.year old who  has a past medical history of CAD (coronary artery disease), COPD (chronic obstructive pulmonary disease) ([facility]), History of , History of cardiac cath, History of cardiac monitoring, History of exercise stress test, Hx of CABG, Hx of Doppler echocardiogram, Hx of Doppler ultrasound, Hyperlipidemia, Hypertension, and Sjoegren syndrome. and presents for management of CAD, cab LIMA TO LAD,Hyperlipidemia; Hypertension;Sjoegren syndrome, lymphedema, mild AS, which are well controlled      Current Outpatient Medications   Medication Sig Dispense Refill    Flaxseed, Linseed, (FLAXSEED OIL) 1200 MG CAPS Take 1,200 mg by mouth daily 1 capsule daily      ferrous sulfate (IRON 325) 325 (65 Fe) MG tablet TAKE 1 TABLET BY MOUTH EVERY DAY      minocycline (MINOCIN;DYNACIN) 50 MG capsule TAKE 1 CAPSULE BY MOUTH EVERY DAY      lisinopril (PRINIVIL;ZESTRIL) 20 MG tablet Take 1 tablet by mouth daily 30 tablet 3    rosuvastatin (CRESTOR) 40 MG tablet Take 1 tablet by mouth every evening 30 tablet 3    furosemide (LASIX) 20 MG tablet Take 1 tablet by mouth 2 times daily 180 tablet 1    carvedilol (COREG) 12.5 MG tablet Take 12.5 mg by mouth 2 times daily       potassium chloride (KLOR-CON M) 20 MEQ extended release tablet Take 1 tablet by mouth daily 180 tablet 1    aspirin 81 MG EC tablet Take 1 tablet by mouth daily 30 tablet 3    Omega 3 1200 MG CAPS Take 1,200 mg by mouth 2 times daily       FARXIGA 5 MG tablet TAKE 1 TABLET BY MOUTH EVERY DAY FOR 90 DAYS       No current facility-administered medications for this visit.      Allergies: Sulfa antibiotics  Past Medical History:   Diagnosis Date    CAD (coronary artery disease)     COPD (chronic obstructive pulmonary disease) ([facility] [address] 2018    Begings of COPD as of today. History of      History of cardiac cath 2019    severe LAD, multiple spot stenosis, mid LAD long segment    History of cardiac monitoring 2018    Sinus Rhythm with PSVT    History of exercise stress test 2019    treadmill, due to Dyspnea, Fatigue. Hx of CABG 02/21/2019    X 2    Hx of Doppler echocardiogram 2022    EF 55-60% Mild LV hypertrophy. Possible dyskinesis of the basal vandana-septal segment. Bilateral atrial dilation. Dilated RV. Mod AS. Mod AI. Thickening of the mitral valve leaflet. Mild calcification of the mitral valve noted. Mod TR. Mod pulmonary artery pressure. 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 Hx of Doppler ultrasound 08/15/2021    Mod disease of the right proximal carotid artery. Mild disease of the left proximal internal carotid artery. The left external carotid artery exhibits significant stenosis.     Hyperlipidemia     Hypertension     Sjoegren syndrome      Past Surgical History:   Procedure Laterality Date    ABLATION OF DYSRHYTHMIC FOCUS  2021    EP Study / SVT ablation (AVNRT)    APPENDECTOMY      CORONARY ARTERY BYPASS GRAFT  2019    CABG x2 LIMA to LAD & LIMA to DIAG    CORONARY ARTERY BYPASS GRAFT N/A 2019    CABG CORONARY ARTERY BYPASS, endoscopic saphenous vein harvesting of the left leg, intraoperative KEEGAN performed by Satya Escalera MD at [address] [place] [address] ([address] [place] [address])      OVARY REMOVAL      TUBAL LIGATION       Family History   Problem Relation Age of Onset    Alzheimer's Disease Mother     Diabetes Father     Heart Attack Father     Heart Attack Sister     Diabetes Sister     Breast Cancer Neg Hx     Ovarian Cancer Neg Hx      Social History     Tobacco Use    Smoking status: Never    Smokeless tobacco: Never   Substance Use Topics ausculation),   Extremities - No cyanosis, clubbing, or significant edema, no varicose veins    Abdomen - No masses, tenderness, or organomegaly, no hepato-splenomegally, no bruits  Musculoskeletal - No significant edema, no kyphosis or scoliosis, no deformity in any extremity noted, muscle strength and tone are normal  Skin: no ulcer,no scar,no stasis dermatitis   Neurologic - alert oriented times 3,Cranial nerves II through XII are grossly intact. There were no gross focal neurologic abnormalities. Psychiatric: normal mood and affect    No results found for: CKTOTAL, CKMB, CKMBINDEX, TROPONINI  BNP:  No results found for: BNP  PT/INR:  No results found for: PTINR  Lab Results   Component Value Date    LABA1C 5.7 2020     Lab Results   Component Value Date    CHOL 164 2020    TRIG 88 2020    HDL 74 (A) 2020    LDLCALC 72 2020    LDLDIRECT 109 (H) 2015     Lab Results   Component Value Date    ALT 21 2020    AST 41 2020     TSH:  No results found for: TSH    EKG: sinus bradycardia  Impression:  Shanelle Haro is a 64 y. o.year old who  has a past medical history of CAD (coronary artery disease), COPD (chronic obstructive pulmonary disease) ([facility]), History of , History of cardiac cath, History of cardiac monitoring, History of exercise stress test, Hx of CABG, Hx of Doppler echocardiogram, Hx of Doppler ultrasound, Hyperlipidemia, Hypertension, and Sjoegren syndrome. and presents with     Plan:  Sinus bradycardia: reduce coreg to 6.25mg bid  Chest pain: most likley reproducible with sternotomy clip, she lifted 20 lbs flower had chest xray and will recommend to see CT sx  Palpitations:stable after SVT ablation, continue coreg  CAD: s/p CABG koenig TO LAD, patient had dissection of LAD WITH angioplasty attempt, Continue aspirin,betablockers, statins, stress test was normal  Carotid stenosis:LICA is 93-11% stenosis, will conservatively treat it.   Murmur:she has MILD TO moderate AS, SANDRO is 1.32 . MEAN GRADIENT of 20mmHg, it wasmild aortic stenosis, SANDRO 1.83 in 2020, will treat conservatively. Dyslipidemia: continue statins, will repeat labs  HTN: stable, continue coreg medications and lisinopril  sjogren syndrome: stable  H/O dizziness since [facility]   All labs, medications and tests reviewed, continue all other medications of all above medical condition listed as is.

## 2023-05-17 ENCOUNTER — OFFICE VISIT (OUTPATIENT)
Dept: CARDIOLOGY CLINIC | Age: 78
End: 2023-05-17
Payer: COMMERCIAL

## 2023-05-17 VITALS
BODY MASS INDEX: 27.29 KG/M2 | HEIGHT: 60 IN | WEIGHT: 139 LBS | DIASTOLIC BLOOD PRESSURE: 80 MMHG | HEART RATE: 70 BPM | SYSTOLIC BLOOD PRESSURE: 140 MMHG | OXYGEN SATURATION: 94 %

## 2023-05-17 DIAGNOSIS — I25.10 CORONARY ARTERY DISEASE INVOLVING NATIVE CORONARY ARTERY OF NATIVE HEART WITHOUT ANGINA PECTORIS: ICD-10-CM

## 2023-05-17 DIAGNOSIS — I10 ESSENTIAL HYPERTENSION: ICD-10-CM

## 2023-05-17 DIAGNOSIS — Z86.79 S/P CATHETER ABLATION OF SLOW PATHWAY: ICD-10-CM

## 2023-05-17 DIAGNOSIS — E78.5 DYSLIPIDEMIA: ICD-10-CM

## 2023-05-17 DIAGNOSIS — I65.23 BILATERAL CAROTID ARTERY STENOSIS: Primary | ICD-10-CM

## 2023-05-17 DIAGNOSIS — Z98.890 S/P CATHETER ABLATION OF SLOW PATHWAY: ICD-10-CM

## 2023-05-17 DIAGNOSIS — I38 VHD (VALVULAR HEART DISEASE): ICD-10-CM

## 2023-05-17 DIAGNOSIS — I47.1 SUPRAVENTRICULAR TACHYCARDIA (HCC): ICD-10-CM

## 2023-05-17 PROCEDURE — 99214 OFFICE O/P EST MOD 30 MIN: CPT | Performed by: NURSE PRACTITIONER

## 2023-05-17 PROCEDURE — 3079F DIAST BP 80-89 MM HG: CPT | Performed by: NURSE PRACTITIONER

## 2023-05-17 PROCEDURE — 3077F SYST BP >= 140 MM HG: CPT | Performed by: NURSE PRACTITIONER

## 2023-05-17 PROCEDURE — 1123F ACP DISCUSS/DSCN MKR DOCD: CPT | Performed by: NURSE PRACTITIONER

## 2023-05-17 RX ORDER — ALENDRONATE SODIUM 70 MG/1
70 TABLET ORAL
COMMUNITY

## 2023-05-17 RX ORDER — CYCLOSPORINE 0.5 MG/ML
1 EMULSION OPHTHALMIC 2 TIMES DAILY
COMMUNITY

## 2023-05-17 RX ORDER — ASPIRIN 81 MG/1
81 TABLET ORAL DAILY
Qty: 30 TABLET | Refills: 3 | Status: SHIPPED | OUTPATIENT
Start: 2023-05-17

## 2023-05-17 RX ORDER — CARVEDILOL 12.5 MG/1
12.5 TABLET ORAL 2 TIMES DAILY WITH MEALS
COMMUNITY

## 2023-05-17 ASSESSMENT — ENCOUNTER SYMPTOMS: SHORTNESS OF BREATH: 0

## 2023-05-17 NOTE — PROGRESS NOTES
fraction of 55-60%. Dyskinetic septal wall motion. Moderately dilated right side. Calcific aortic valve with mild stenosis, mean gradient of 16 mmHg and an SANDRO of 1.83 cm sq. Thickening of mitral valve leaflets is noted. Moderate tricuspid regurgitation. Moderate pulmonary hypertension at 51 mmHg. No evidence of pericardial effusion. Echo:5/10/22  Left ventricular function is normal, EF 55-60%. Mild concentric left ventricular hypertrophy. Possible dyskinesis of the Basal vandana-septal segment   Bilateral atrial dilatation. Dilated right ventricle. Moderate aortic stenosis with mean gradient of 20 mmHg, with an SANDRO of 1.32 cm^2. Moderate aortic insufficiency with PHT of 326 msec. Thickening of the mitral valve leaflet. Mild calcification of the mitral valve noted. Moderate tricuspid regurgitation. Moderately elevated pulmonary artery pressure, RVSP 49 mmHg. 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 No evidence of pericardial effusion. Cath:2/21/19  severe LAD, multiple spot stenosis, mid LAD long segment of   stenosis, vessel is very tortous and calcified and balloon could   not cross distal LAD lesion, she had vessel spasm , required   NTG, nicardene to open for spasm, she required dopamine for   brief period and had VTACHs, required amiodarone IVP, AND IABP   was placed. FInal angiogram of LAD after balloon pump showed full vessel run   off and and JOSE III flow, she had multiple spots of stenosis   visible. 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

## 2023-05-17 NOTE — PATIENT INSTRUCTIONS
**It is YOUR responsibilty to bring medication bottles and/or updated medication list to [address] [place] [address]. This will allow us to better serve you and all your healthcare needs**    [facility] Locations - No appointment necessary. Sites open Monday to Friday. Call your preferred location for test preparation, business   hours and other information you need. Command Information accepts [employer]. [address] Jo [place], [address] [facility]  Phone: [phone] Jenaro Redd  [address] [facility]  [address] Jenaro Redd, [address] [facility]  Phone: [phone]       Thank you for allowing us to care for you today! We want to ensure we can follow your treatment plan and we strive to give you the best outcomes and experience possible. If you ever have a life threatening emergency and call 911 - for an ambulance (EMS)   Our providers can only care for you at:   [facility] or [facility]. Even if you have someone take you or you drive yourself we can only care for you in a [facility] facility. Our providers are not setup at the other healthcare locations! Please be informed that if you contact our office outside of normal business hours the physician on call cannot help with any scheduling or rescheduling issues, procedure instruction questions or any type of medication issue. We advise you for any urgent/emergency that you go to the nearest emergency room!     PLEASE CALL OUR OFFICE DURING NORMAL BUSINESS HOURS    Monday - Friday   8 am to 5 pm    [place] [address]: [phone]    [place]:  [phone]

## 2023-10-18 ENCOUNTER — HOSPITAL ENCOUNTER (OUTPATIENT)
Dept: WOMENS IMAGING | Age: 78
Discharge: HOME | End: 2023-10-18
Payer: COMMERCIAL

## 2023-10-18 VITALS — WEIGHT: 140 LBS | HEIGHT: 60 IN | BODY MASS INDEX: 27.48 KG/M2

## 2023-10-18 DIAGNOSIS — Z12.31 SCREENING MAMMOGRAM, ENCOUNTER FOR: ICD-10-CM

## 2023-10-18 PROCEDURE — 77063 BREAST TOMOSYNTHESIS BI: CPT

## 2023-11-21 ENCOUNTER — OFFICE VISIT (OUTPATIENT)
Dept: CARDIOLOGY CLINIC | Age: 78
End: 2023-11-21
Payer: COMMERCIAL

## 2023-11-21 VITALS
DIASTOLIC BLOOD PRESSURE: 62 MMHG | HEART RATE: 62 BPM | SYSTOLIC BLOOD PRESSURE: 138 MMHG | WEIGHT: 141 LBS | BODY MASS INDEX: 26.62 KG/M2 | HEIGHT: 61 IN

## 2023-11-21 DIAGNOSIS — I10 ESSENTIAL HYPERTENSION: ICD-10-CM

## 2023-11-21 DIAGNOSIS — I38 VHD (VALVULAR HEART DISEASE): ICD-10-CM

## 2023-11-21 DIAGNOSIS — I25.10 CORONARY ARTERY DISEASE INVOLVING NATIVE CORONARY ARTERY OF NATIVE HEART WITHOUT ANGINA PECTORIS: ICD-10-CM

## 2023-11-21 DIAGNOSIS — I65.23 BILATERAL CAROTID ARTERY STENOSIS: Primary | ICD-10-CM

## 2023-11-21 DIAGNOSIS — E78.5 DYSLIPIDEMIA: ICD-10-CM

## 2023-11-21 PROCEDURE — 3075F SYST BP GE 130 - 139MM HG: CPT | Performed by: NURSE PRACTITIONER

## 2023-11-21 PROCEDURE — 1123F ACP DISCUSS/DSCN MKR DOCD: CPT | Performed by: NURSE PRACTITIONER

## 2023-11-21 PROCEDURE — 3078F DIAST BP <80 MM HG: CPT | Performed by: NURSE PRACTITIONER

## 2023-11-21 PROCEDURE — 99214 OFFICE O/P EST MOD 30 MIN: CPT | Performed by: NURSE PRACTITIONER

## 2023-11-21 RX ORDER — FUROSEMIDE 20 MG/1
20 TABLET ORAL DAILY
Qty: 180 TABLET | Refills: 3 | Status: SHIPPED | OUTPATIENT
Start: 2023-11-21

## 2023-11-21 ASSESSMENT — ENCOUNTER SYMPTOMS: SHORTNESS OF BREATH: 0

## 2023-12-06 ENCOUNTER — TELEPHONE (OUTPATIENT)
Dept: CARDIOLOGY CLINIC | Age: 78
End: 2023-12-06

## 2023-12-06 NOTE — TELEPHONE ENCOUNTER
Called pt for Echo and Carotid US result. Rebel's note: Echo and Carotid US are unchanged     Left Ventricle: Normal left ventricular systolic function with a visually estimated EF of 55 - 60%. Left ventricle size is normal. Mildly increased wall thickness. Normal wall motion. Normal diastolic function. Aortic Valve: Mild sclerosis of the aortic valve cusp. Moderate regurgitation. AV PHT is 394.0 ms. Mild stenosis of the aortic valve. AV mean gradient is 12 mmHg. Mitral Valve: Mild to moderate regurgitation. Tricuspid Valve: Moderately elevated RVSP. The estimated RVSP is 48 mmHg. Moderate (50-69%) stenosis in the right internal carotid artery. Moderate and calcific plaque in the right internal carotid artery. Mild (<50%) stenosis in the left internal carotid artery. Mild and calcific plaque in the left internal carotid artery. Normal antegrade flow involving the right vertebral artery. Normal antegrade flow involving the left vertebral artery. Pt verbalized understanding and will keep f/u appt.

## 2024-03-04 RX ORDER — ASPIRIN 81 MG/1
81 TABLET ORAL DAILY
Qty: 90 TABLET | Refills: 1 | Status: SHIPPED | OUTPATIENT
Start: 2024-03-04

## 2024-05-13 ENCOUNTER — OFFICE VISIT (OUTPATIENT)
Dept: CARDIOLOGY CLINIC | Age: 79
End: 2024-05-13
Payer: COMMERCIAL

## 2024-05-13 VITALS
DIASTOLIC BLOOD PRESSURE: 72 MMHG | WEIGHT: 142 LBS | BODY MASS INDEX: 26.81 KG/M2 | HEIGHT: 61 IN | SYSTOLIC BLOOD PRESSURE: 136 MMHG | HEART RATE: 68 BPM | OXYGEN SATURATION: 94 %

## 2024-05-13 DIAGNOSIS — I25.10 CORONARY ARTERY DISEASE INVOLVING NATIVE CORONARY ARTERY OF NATIVE HEART WITHOUT ANGINA PECTORIS: ICD-10-CM

## 2024-05-13 DIAGNOSIS — I65.23 BILATERAL CAROTID ARTERY STENOSIS: ICD-10-CM

## 2024-05-13 DIAGNOSIS — I38 VHD (VALVULAR HEART DISEASE): ICD-10-CM

## 2024-05-13 DIAGNOSIS — I10 ESSENTIAL HYPERTENSION: Primary | ICD-10-CM

## 2024-05-13 DIAGNOSIS — E78.5 DYSLIPIDEMIA: ICD-10-CM

## 2024-05-13 PROCEDURE — 1123F ACP DISCUSS/DSCN MKR DOCD: CPT | Performed by: INTERNAL MEDICINE

## 2024-05-13 PROCEDURE — 93000 ELECTROCARDIOGRAM COMPLETE: CPT | Performed by: INTERNAL MEDICINE

## 2024-05-13 PROCEDURE — 99214 OFFICE O/P EST MOD 30 MIN: CPT | Performed by: INTERNAL MEDICINE

## 2024-05-13 PROCEDURE — 3075F SYST BP GE 130 - 139MM HG: CPT | Performed by: INTERNAL MEDICINE

## 2024-05-13 PROCEDURE — 3078F DIAST BP <80 MM HG: CPT | Performed by: INTERNAL MEDICINE

## 2024-05-13 NOTE — PATIENT INSTRUCTIONS
Please be informed that if you contact our office outside of normal business hours the physician on call cannot help with any scheduling or rescheduling issues, procedure instruction questions or any type of medication issue.    We advise you for any urgent/emergency that you go to the nearest emergency room!    PLEASE CALL OUR OFFICE DURING NORMAL BUSINESS HOURS    Monday - Friday   8 am to 5 pm    [place]: [phone]    [place]: [phone]    [place]:  [phone]  **It is YOUR responsibilty to bring medication bottles and/or updated medication list to EACH APPOINTMENT. This will allow us to better serve you and all your healthcare needs**  Thank you for allowing us to care for you today!   We want to ensure we can follow your treatment plan and we strive to give you the best outcomes and experience possible.   If you ever have a life threatening emergency and call 911 - for an ambulance (EMS)   Our providers can only care for you at:   [facility] or [facility].   Even if you have someone take you or you drive yourself we can only care for you in a [facility] facility. Our providers are not setup at the other healthcare locations!   We are committed to providing you the best care possible.    If you receive a survey after visiting one of our offices, please take time to share your experience concerning your physician office visit.  These surveys are confidential and no health information about you is shared.    We are eager to improve for you and we are counting on your feedback to help make that happen.

## 2024-11-14 ENCOUNTER — HOSPITAL ENCOUNTER (OUTPATIENT)
Age: 79
Discharge: HOME | End: 2024-11-14
Payer: COMMERCIAL

## 2024-11-14 ENCOUNTER — OFFICE VISIT (OUTPATIENT)
Dept: CARDIOLOGY CLINIC | Age: 79
End: 2024-11-14
Payer: COMMERCIAL

## 2024-11-14 ENCOUNTER — HOSPITAL ENCOUNTER (OUTPATIENT)
Dept: GENERAL RADIOLOGY | Age: 79
Discharge: HOME | End: 2024-11-14
Payer: COMMERCIAL

## 2024-11-14 VITALS
HEART RATE: 64 BPM | SYSTOLIC BLOOD PRESSURE: 126 MMHG | BODY MASS INDEX: 26.11 KG/M2 | DIASTOLIC BLOOD PRESSURE: 70 MMHG | WEIGHT: 133 LBS | HEIGHT: 60 IN

## 2024-11-14 DIAGNOSIS — Z98.890 S/P CATHETER ABLATION OF SLOW PATHWAY: ICD-10-CM

## 2024-11-14 DIAGNOSIS — I65.23 BILATERAL CAROTID ARTERY STENOSIS: ICD-10-CM

## 2024-11-14 DIAGNOSIS — E78.5 DYSLIPIDEMIA: ICD-10-CM

## 2024-11-14 DIAGNOSIS — I38 VHD (VALVULAR HEART DISEASE): ICD-10-CM

## 2024-11-14 DIAGNOSIS — I25.10 CORONARY ARTERY DISEASE INVOLVING NATIVE CORONARY ARTERY OF NATIVE HEART WITHOUT ANGINA PECTORIS: ICD-10-CM

## 2024-11-14 DIAGNOSIS — I10 ESSENTIAL HYPERTENSION: ICD-10-CM

## 2024-11-14 DIAGNOSIS — I65.23 BILATERAL CAROTID ARTERY STENOSIS: Primary | ICD-10-CM

## 2024-11-14 DIAGNOSIS — Z86.79 S/P CATHETER ABLATION OF SLOW PATHWAY: ICD-10-CM

## 2024-11-14 PROCEDURE — 3078F DIAST BP <80 MM HG: CPT | Performed by: INTERNAL MEDICINE

## 2024-11-14 PROCEDURE — 93000 ELECTROCARDIOGRAM COMPLETE: CPT | Performed by: INTERNAL MEDICINE

## 2024-11-14 PROCEDURE — 3074F SYST BP LT 130 MM HG: CPT | Performed by: INTERNAL MEDICINE

## 2024-11-14 PROCEDURE — 1123F ACP DISCUSS/DSCN MKR DOCD: CPT | Performed by: INTERNAL MEDICINE

## 2024-11-14 PROCEDURE — 99214 OFFICE O/P EST MOD 30 MIN: CPT | Performed by: INTERNAL MEDICINE

## 2024-11-14 PROCEDURE — 71046 X-RAY EXAM CHEST 2 VIEWS: CPT

## 2024-11-14 RX ORDER — POTASSIUM CHLORIDE 1.5 G/1.58G
20 POWDER, FOR SOLUTION ORAL 2 TIMES DAILY
COMMUNITY

## 2024-11-14 NOTE — PROGRESS NOTES
Tobacco Use    Smoking status: Never    Smokeless tobacco: Never   Substance Use Topics    Alcohol use: No     Comment: Caffeine: 1 cup of coffee every week      [unfilled]  Review of Systems:   Constitutional: No Fever or Weight Loss   Eyes: No Decreased Vision  ENT: No Headaches, Hearing Loss or Vertigo  Cardiovascular: No chest pain, dyspnea on exertion, palpitations or loss of consciousness  Respiratory: No cough or wheezing    Gastrointestinal: No abdominal pain, appetite loss, blood in stools, constipation, diarrhea or heartburn  Genitourinary: No dysuria, trouble voiding, or hematuria  Musculoskeletal:  No gait disturbance, weakness or joint complaints  Integumentary: No rash or pruritis  Neurological: No TIA or stroke symptoms  Psychiatric: No anxiety or depression  Endocrine: No malaise, fatigue or temperature intolerance  Hematologic/Lymphatic: No bleeding problems, blood clots or swollen lymph nodes  Allergic/Immunologic: No nasal congestion or hives  All systems negative except as marked.   Objective:  /70 (Position: Standing)   Pulse 64   Ht 1.524 m (5')   Wt 60.3 kg (133 lb)   BMI 25.97 kg/m²   Wt Readings from Last 3 Encounters:   11/14/24 60.3 kg (133 lb)   05/13/24 64.4 kg (142 lb)   12/01/23 59.4 kg (131 lb)     Body mass index is 25.97 kg/m².  GENERAL - Alert, oriented, pleasant, in no apparent distress,normal grooming  HEENT - pupils are intact, cornea intact, conjunctive normal color, ears are normal in exam,  Neck - Supple.  No jugular venous distention noted. No carotid bruits, no apical -carotid delay  Respiratory:  Normal breath sounds, No respiratory distress, No wheezing, No chest tenderness.,no use of accessory muscles, diaphragm movement is normal  Cardiovascular: (PMI) apex non displaced,no lifts no thrills, no s3,no s4, Normal heart rate, Normal rhythm, No murmurs, No rubs, No gallops. Carotid arteries pulse and amplitude are normal no bruit, no abdominal bruit noted (

## 2024-11-14 NOTE — PATIENT INSTRUCTIONS
We are committed to providing you the best care possible.    If you receive a survey after visiting one of our offices, please take time to share your experience concerning your physician office visit.  These surveys are confidential and no health information about you is shared.    We are eager to improve for you and we are counting on your feedback to help make that happen.      **It is YOUR responsibilty to bring medication bottles and/or updated medication list to EACH APPOINTMENT. This will allow us to better serve you and all your healthcare needs**  Thank you for allowing us to care for you today!   We want to ensure we can follow your treatment plan and we strive to give you the best outcomes and experience possible.   If you ever have a life threatening emergency and call 911 - for an ambulance (EMS)   Our providers can only care for you at:   [facility] or [facility].   Even if you have someone take you or you drive yourself we can only care for you in a [facility] facility. Our providers are not setup at the other healthcare locations!   Please be informed that if you contact our office outside of normal business hours the physician on call cannot help with any scheduling or rescheduling issues, procedure instruction questions or any type of medication issue.    We advise you for any urgent/emergency that you go to the nearest emergency room!    PLEASE CALL OUR OFFICE DURING NORMAL BUSINESS HOURS    Monday - Friday   8 am to 5 pm    [place]: [phone]    [place]: [phone]    [place]:  [phone]

## 2024-11-26 LAB
ALBUMIN: 4.2 G/DL
ALP BLD-CCNC: 70 U/L
ALT SERPL-CCNC: 15 U/L
ANION GAP SERPL CALCULATED.3IONS-SCNC: NORMAL MMOL/L
AST SERPL-CCNC: 42 U/L
BASOPHILS ABSOLUTE: 0.1 /ΜL
BASOPHILS RELATIVE PERCENT: 1 %
BILIRUB SERPL-MCNC: 0.5 MG/DL (ref 0.1–1.4)
BUN BLDV-MCNC: 12 MG/DL
CALCIUM SERPL-MCNC: 9.4 MG/DL
CHLORIDE BLD-SCNC: 101 MMOL/L
CHOLESTEROL, TOTAL: 209 MG/DL
CHOLESTEROL/HDL RATIO: NORMAL
CO2: 27 MMOL/L
CREAT SERPL-MCNC: 0.9 MG/DL
EOSINOPHILS ABSOLUTE: 0.1 /ΜL
EOSINOPHILS RELATIVE PERCENT: 2 %
ESTIMATED AVERAGE GLUCOSE: NORMAL
GFR, ESTIMATED: 65
GLUCOSE BLD-MCNC: 95 MG/DL
HBA1C MFR BLD: 6 %
HCT VFR BLD CALC: 45.9 % (ref 36–46)
HDLC SERPL-MCNC: 64 MG/DL (ref 35–70)
HEMOGLOBIN: 14.5 G/DL (ref 12–16)
LDL CHOLESTEROL: 126
LYMPHOCYTES ABSOLUTE: 1 /ΜL
LYMPHOCYTES RELATIVE PERCENT: 16 %
MCH RBC QN AUTO: 29 PG
MCHC RBC AUTO-ENTMCNC: 31.6 G/DL
MCV RBC AUTO: 91.8 FL
MONOCYTES ABSOLUTE: 0.5 /ΜL
MONOCYTES RELATIVE PERCENT: 8 %
NEUTROPHILS ABSOLUTE: 4.1 /ΜL
NEUTROPHILS RELATIVE PERCENT: 71 %
NONHDLC SERPL-MCNC: NORMAL MG/DL
PLATELET # BLD: 129 K/ΜL
PMV BLD AUTO: 13.4 FL
POTASSIUM SERPL-SCNC: 3.9 MMOL/L
RBC # BLD: 5 10^6/ΜL
SODIUM BLD-SCNC: 139 MMOL/L
TOTAL PROTEIN: 7 G/DL (ref 6.4–8.2)
TRIGL SERPL-MCNC: 97 MG/DL
VLDLC SERPL CALC-MCNC: 19 MG/DL
WBC # BLD: 5.8 10^3/ML

## 2025-04-24 ENCOUNTER — OFFICE VISIT (OUTPATIENT)
Dept: CARDIOLOGY CLINIC | Age: 80
End: 2025-04-24
Payer: COMMERCIAL

## 2025-04-24 ENCOUNTER — TELEPHONE (OUTPATIENT)
Dept: CARDIOLOGY CLINIC | Age: 80
End: 2025-04-24

## 2025-04-24 VITALS
WEIGHT: 134.2 LBS | BODY MASS INDEX: 26.35 KG/M2 | HEART RATE: 78 BPM | DIASTOLIC BLOOD PRESSURE: 76 MMHG | SYSTOLIC BLOOD PRESSURE: 138 MMHG | HEIGHT: 60 IN

## 2025-04-24 DIAGNOSIS — I38 VHD (VALVULAR HEART DISEASE): ICD-10-CM

## 2025-04-24 DIAGNOSIS — Z86.79 S/P CATHETER ABLATION OF SLOW PATHWAY: ICD-10-CM

## 2025-04-24 DIAGNOSIS — I65.23 BILATERAL CAROTID ARTERY STENOSIS: Primary | ICD-10-CM

## 2025-04-24 DIAGNOSIS — I25.10 CORONARY ARTERY DISEASE INVOLVING NATIVE CORONARY ARTERY OF NATIVE HEART WITHOUT ANGINA PECTORIS: ICD-10-CM

## 2025-04-24 DIAGNOSIS — Z98.890 S/P CATHETER ABLATION OF SLOW PATHWAY: ICD-10-CM

## 2025-04-24 DIAGNOSIS — I10 ESSENTIAL HYPERTENSION: ICD-10-CM

## 2025-04-24 DIAGNOSIS — E78.5 DYSLIPIDEMIA: ICD-10-CM

## 2025-04-24 PROCEDURE — 3075F SYST BP GE 130 - 139MM HG: CPT | Performed by: NURSE PRACTITIONER

## 2025-04-24 PROCEDURE — 93000 ELECTROCARDIOGRAM COMPLETE: CPT | Performed by: NURSE PRACTITIONER

## 2025-04-24 PROCEDURE — 3078F DIAST BP <80 MM HG: CPT | Performed by: NURSE PRACTITIONER

## 2025-04-24 PROCEDURE — 99214 OFFICE O/P EST MOD 30 MIN: CPT | Performed by: NURSE PRACTITIONER

## 2025-04-24 PROCEDURE — 1159F MED LIST DOCD IN RCRD: CPT | Performed by: NURSE PRACTITIONER

## 2025-04-24 PROCEDURE — 1123F ACP DISCUSS/DSCN MKR DOCD: CPT | Performed by: NURSE PRACTITIONER

## 2025-04-24 ASSESSMENT — ENCOUNTER SYMPTOMS: SHORTNESS OF BREATH: 0

## 2025-04-24 NOTE — PROGRESS NOTES
[place] [address]  Phone: [phone]    Fax [phone]                  Linda Campos MD, [facility]       Alex Dela Cruz MD, [facility] Lanie Stack MD, [facility]      MD Domo Benedict MD, [facility] Pavel Courtney MD, [facility]     Luna Flores MD, [facility]  Dixie Desai, APRN       Lilliam Sanz, APRN  Flores Rodriguez, APRN       Rebel Snell, APRN        Cardiology Progress Note      2025    RE: Talia Lindo  (1945)                             Primary cardiologist: Dr. Serene Stokes       Subjective:  CC:   1. Bilateral carotid artery stenosis    2. Coronary artery disease involving native coronary artery of native heart without angina pectoris    3. Essential hypertension    4. Dyslipidemia    5. S/P catheter ablation of slow pathway          HPI: Talia Lindo, who is a  79 y.o. year old female with a past medical history as listed below.  Patient presents to the office for follow up on CAD (CABG x 2019), HTN, aortic stenosis, carotid stenosis, S/P ablation of AVNRT, and hyperlipidemia.  Patient had ablation of AVNRT in 2021. Patient walks daily for 45 min on treadmill. Patient is  an active female who walks regularly. Patient is  compliant with medications.  Patient denies any chest pain, shortness of breath, dizziness, syncope, or palpitations.    Past Medical History:   Diagnosis Date    CAD (coronary artery disease)     COPD (chronic obstructive pulmonary disease) ([facility]) 2018    Begings of COPD as of today.    History of      History of cardiac cath 2019    severe LAD, multiple spot stenosis, mid LAD long segment    History of cardiac monitoring 2018    Sinus Rhythm with PSVT    History of exercise stress test 2019    treadmill, due to Dyspnea, Fatigue.    Hx of CABG 02/21/2019    X 2    Hx of Doppler echocardiogram 2022    EF 55-60% Mild LV hypertrophy. Possible dyskinesis of the basal

## 2025-04-24 NOTE — PROGRESS NOTES
CLINICAL STAFF DOCUMENTATION         Talia CONTRERAS [facility]  1945  [id number]    Have you had any Chest Pain recently? - Yes  What type of pain is it? - Tightness   Tender to palpate (touch)? No  Does the pain radiate or stay in one place? - stays in one place.   How long does the pain last? -  seconds    How Severe is the pain? - 3  Is there anything that aggravates or triggers the pain? Pt denies.   Did you take any medication? Anti-acid    And did it relieve the pain - Yes  Is there anything that relieves it? Pt denies.   Do you have any other symptoms at the same time? Pt denies.         Have you had any Shortness of Breath - No    Have you had any dizziness - Yes  When do you feel dizzy? Pt believes may be related to her medication farxiga has already brought this up to    Does the room spin? No  How long does it last .  seconds     Have you had any palpitations recently? - No      Do you have any edema - swelling in both legs, ankles and feet. Primarily left sided. Patient wears compression stockings.         When did you have your last labs drawn within the last year   Ezra Day MD   Do we have the labs in their chart No      Do you need any prescriptions refilled? - No    Do you have a surgery or procedure scheduled in the near future - No        Caffeine? - Yes  How much caffeine? .1  cups once a week

## 2025-04-24 NOTE — TELEPHONE ENCOUNTER
LVM to have labs faxed to our office included office number to call back if they had any questions / Calling the patients PCP office to see if they've had any labs drawn within the last year.

## (undated) DEVICE — CORD ES L15FT PT RET REUSE VALLEYLAB REM

## (undated) DEVICE — MARKER,SKIN,WI/RULER AND LABELS: Brand: MEDLINE

## (undated) DEVICE — SUTURE PROL 3-0 L36IN NONABSORBABLE BLU L26MM SH 1/2 CIR P8522

## (undated) DEVICE — ELECTRODE ES AD CRDLSS PT RET REM POLYHESIVE

## (undated) DEVICE — FOGARTY - HYDRAGRIP SURGICAL - CLAMP INSERTS: Brand: FOGARTY SOFTJAW

## (undated) DEVICE — MPS® DELIVERY SET W/ARREST AGENT AND ADDITIVE CASSETTES, HEAT EXCHANGER & 10 FT. DELIVERY TUBING: Brand: MPS

## (undated) DEVICE — ROTATING SURGICAL PUNCHES, 1 PER POUCH: Brand: A&E MEDICAL / ROTATING SURGICAL PUNCHES

## (undated) DEVICE — SUTURE VCRL 2-0 L36IN ABSRB UD CTX L48MM 1/2 CIR TAPERPOINT J979H

## (undated) DEVICE — SWAN-GANZ THERMODILUTION CATHETER: Brand: SWAN-GANZ

## (undated) DEVICE — GLOVE SURG SZ 65 L12IN FNGR THK87MIL WHT LTX FREE

## (undated) DEVICE — DEVICE RESUS AD TB L40IN SELF INFL MASK TEXT BG DBL SWVL EL

## (undated) DEVICE — SUTURE NRLN SZ 1 L18IN NONABSORBABLE BLK L36MM CT-1 1/2 CIR C520D

## (undated) DEVICE — CLIP SM RED INTERN HMOCLP TITAN LIGATING

## (undated) DEVICE — KIT INTRO 8.5FR L4IN PERC POLYUR SHTH RADPQ W/ INTEGR

## (undated) DEVICE — CONNECTOR DRNGE W3/8-0.5XH3/16XL3/16IN 2:1 SIL CARD STR

## (undated) DEVICE — DRAPE,UTILITY,XL,4/PK,STERILE: Brand: MEDLINE

## (undated) DEVICE — BLANKET THER AD W24XL60IN FAB COVERING SUP SFT ULT THN LTWT

## (undated) DEVICE — ELECTRODE ES L2.75IN S STL INSUL BLDE W/ SL EDGE

## (undated) DEVICE — GLOVE ORANGE PI 7   MSG9070

## (undated) DEVICE — CANNULA SUCT TIP L8MM DIA24FR INTCARD RIG SUC 0.25IN CONN

## (undated) DEVICE — Z INACTIVE USE 2641837 CLIP LIG M BLU TI HRT SHP WIRE HORZ 600 PER BX

## (undated) DEVICE — GLOVE ORANGE PI 8   MSG9080

## (undated) DEVICE — DRAIN,WOUND,ROUND,24FR,5/16",FULL-FLUTED: Brand: MEDLINE

## (undated) DEVICE — CANNULA ART 20FR NVENT 3 8IN CONN EOPA 3D

## (undated) DEVICE — SUTURE ETHBND EXCEL SZ 2-0 L36IN NONABSORBABLE GRN L26MM SH X523H

## (undated) DEVICE — CANNULA PERF L15IN DIA29FR VEN 3 STG THN WALL DSGN W  VENT

## (undated) DEVICE — TUBING INSUFFLATOR HEAT HI FLO SET PNEUMOCLEAR

## (undated) DEVICE — Device: Brand: VIRTUOSAPH PLUS WITH RADIAL INDICATION

## (undated) DEVICE — SUTURE NABSORBABLE L18IN SZ 8-0 BLU BV175-6 L8MM 3/8 CIR M8742

## (undated) DEVICE — DRAIN SURG SGL COLL PT TB FOR ATS BG OASIS

## (undated) DEVICE — ANESTHESIA CIRCUIT ADULT-LF: Brand: MEDLINE INDUSTRIES, INC.

## (undated) DEVICE — SUTURE PROL 7-0 L18IN NONABSORBABLE BLU L9.3MM BV-1 3/8 CIR M8701

## (undated) DEVICE — WAX SURG 2.5GM HEMSTAT BNE BEESWAX PARAFFIN ISO PALMITATE

## (undated) DEVICE — SUTURE SURGLON SZ 1 L18IN NONABSORBABLE BLK GS 21 L37MM 1 2 8886196272

## (undated) DEVICE — BLADE SAW W10XL54MM FOR PRI REPEAT STRNOTMY

## (undated) DEVICE — Z INACTIVE USE 2540311 LEAD PACE L475MM CHN A OR V MYOCARDIAL STEROID ELUT SIL

## (undated) DEVICE — SUTURE ETHIB EXCL X BR GRN V-7 DA 2-0 30 PX977 PX977H

## (undated) DEVICE — SUTURE NONABSORBABLE MONOFILAMENT 6-0 C-1 4X18 IN PROLENE M8718

## (undated) DEVICE — SPONGE LAP W18XL18IN WHT COT 4 PLY FLD STRUNG RADPQ DISP ST

## (undated) DEVICE — GEL US 20GM NONIRRITATING OVERWRAPPED FILE PCH TRNSMIT

## (undated) DEVICE — DRAPE SHEET ULTRAGARD: Brand: MEDLINE

## (undated) DEVICE — LINER SUCT CANSTR 1500CC SEMI RIG W/ POR HYDROPHOBIC SHUT

## (undated) DEVICE — 6 FOOT DISPOSABLE EXTENSION CABLE WITH SAFE CONNECT / SCREW-DOWN

## (undated) DEVICE — SUTURE MCRYL SZ 3-0 L27IN ABSRB UD L24MM PS-1 3/8 CIR PRIM Y936H

## (undated) DEVICE — TOWEL,OR,DSP,ST,BLUE,STD,6/PK,12PK/CS: Brand: MEDLINE

## (undated) DEVICE — TUBING SUCT 9 11FR L475IN RIG SHFT MINI SUC TIP DLP

## (undated) DEVICE — CANNULA PERF L5.5IN DIA9FR AORT ROOT AG STD TIP W/ VENT LN

## (undated) DEVICE — Z DUPLICATE USE 2624755 KIT NEG PRSS DSG W/ PRSS INDIC PTCH STRP 45ML CANSTR CARR

## (undated) DEVICE — CATHETER ETER IV L1IN OD22GA POLYUR PERIPH WNG HUB SFTY SHLD

## (undated) DEVICE — GLOVE SURG SZ 6 THK91MIL LTX FREE SYN POLYISOPRENE ANTI

## (undated) DEVICE — SUTURE S STL SZ 5 L18IN NONABSORBABLE SIL V-40 L48MM 1/2 M650G

## (undated) DEVICE — COVER,TABLE,44X90,STERILE: Brand: MEDLINE

## (undated) DEVICE — DRAIN SURG 24FR L5/16IN DIA8MM SIL RND HUBLESS FULL FLUT

## (undated) DEVICE — BLADE ES L2.75IN ELASTOMERIC COAT DURABLE BEND UPTO 90DEG

## (undated) DEVICE — DECANTER FLD 9IN ST BG FOR ASEP TRNSF OF FLD

## (undated) DEVICE — DISCONTINUED USE 394504 SYRINGE LUER LOCK TIP 12 ML

## (undated) DEVICE — SET ADMIN L105IN 10GTT 3 NDL FREE CK VLV 2 PC M LUERLOCK

## (undated) DEVICE — MINI STICK MAX COAXIAL MICROINTRODUCER KIT: Brand: ANGIODYNAMICS

## (undated) DEVICE — YANKAUER,FLEXIBLE HANDLE,REGLR CAPACITY: Brand: MEDLINE INDUSTRIES, INC.

## (undated) DEVICE — DRESSING TRNSPAR W5XL4.5IN FLM SHT SEMIPERMEABLE WIND

## (undated) DEVICE — SENSOR PLSE OXMTR AD CBL L3FT ADH TRANSMISSIVE

## (undated) DEVICE — BLADE SAW STRNM 10X35X0.6MM

## (undated) DEVICE — SOLUTION IV 1000ML 0.9% SOD CHL FOR IRRIG PLAS CONT

## (undated) DEVICE — SET INFUS 25ML L117IN PMP MOD CK VLV RLER CLMP 2 SMRTSITE

## (undated) DEVICE — SYRINGE, LUER LOCK, 10ML: Brand: MEDLINE

## (undated) DEVICE — PERCUTANEOUS INSERTION KIT-ARTERIAL: Brand: PERCUTANEOUS INSERTION KIT-ARTERIAL

## (undated) DEVICE — SET ADMIN PRIMING 67ML L105IN NVENT 180UM FLTR 3 RLER CLMP

## (undated) DEVICE — SKIN AFFIX SURG ADHESIVE 72/CS 0.55ML: Brand: MEDLINE

## (undated) DEVICE — TOWEL,OR,DSP,ST,WHITE,DLX,XR,4/PK,20PK/C: Brand: MEDLINE

## (undated) DEVICE — DUAL LUMEN STOMACH TUBE: Brand: SALEM SUMP

## (undated) DEVICE — 3M™ STERI-DRAPE™ INSTRUMENT POUCH 1018: Brand: STERI-DRAPE™

## (undated) DEVICE — CONNECTOR PIPE 3/8X1/2IN REDUC STR

## (undated) DEVICE — SUTURE SURGLON SZ 1 L30IN NONABSORBABLE BLK NO NDL NYL PRE 8886191971

## (undated) DEVICE — TUBING, SUCTION, 9/32" X 10', STRAIGHT: Brand: MEDLINE

## (undated) DEVICE — LINER,SEMI-RIGID,3000CC,50EA/CS: Brand: MEDLINE

## (undated) DEVICE — CHLORAPREP 26ML ORANGE

## (undated) DEVICE — DRAIN SURG L3/8-1/2IN DIA3/16IN SIL CARD CONN 1:1 BLAK

## (undated) DEVICE — SUTURE PROL SZ 4-0 L36IN NONABSORBABLE BLU L26MM SH 1/2 CIR 8521H

## (undated) DEVICE — Z INACTIVE USE 2660664 SOLUTION IRRIG 3000ML 0.9% SOD CHL USP UROMATIC PLAS CONT

## (undated) DEVICE — SUTURE VCRL SZ 2 L27IN ABSRB UD L65MM TP-1 1/2 CIR J849G

## (undated) DEVICE — SUTURE S STL SZ 5 L18IN NONABSORBABLE SIL L48MM CCS 1/4 CIR M657G

## (undated) DEVICE — KIT CVC AD 7FR L20CM POLYUR BLU FLEXTIP ANTIMIC MULTILUMEN

## (undated) DEVICE — TOTAL TRAY, 16FR 10ML SIL FOLEY, URN: Brand: MEDLINE